# Patient Record
Sex: FEMALE | Race: WHITE | Employment: UNEMPLOYED | URBAN - METROPOLITAN AREA
[De-identification: names, ages, dates, MRNs, and addresses within clinical notes are randomized per-mention and may not be internally consistent; named-entity substitution may affect disease eponyms.]

---

## 2018-03-07 ENCOUNTER — APPOINTMENT (OUTPATIENT)
Dept: ULTRASOUND IMAGING | Age: 73
DRG: 417 | End: 2018-03-07
Attending: PHYSICIAN ASSISTANT
Payer: MEDICARE

## 2018-03-07 ENCOUNTER — APPOINTMENT (OUTPATIENT)
Dept: GENERAL RADIOLOGY | Age: 73
DRG: 417 | End: 2018-03-07
Attending: PHYSICIAN ASSISTANT
Payer: MEDICARE

## 2018-03-07 ENCOUNTER — HOSPITAL ENCOUNTER (INPATIENT)
Age: 73
LOS: 4 days | Discharge: HOME HEALTH | DRG: 417 | End: 2018-03-11
Attending: EMERGENCY MEDICINE | Admitting: FAMILY MEDICINE
Payer: MEDICARE

## 2018-03-07 ENCOUNTER — APPOINTMENT (OUTPATIENT)
Dept: CT IMAGING | Age: 73
DRG: 417 | End: 2018-03-07
Attending: PHYSICIAN ASSISTANT
Payer: MEDICARE

## 2018-03-07 DIAGNOSIS — K80.21 CALCULUS OF GALLBLADDER WITH BILIARY OBSTRUCTION BUT WITHOUT CHOLECYSTITIS: ICD-10-CM

## 2018-03-07 DIAGNOSIS — Z90.49 S/P LAPAROSCOPIC CHOLECYSTECTOMY: ICD-10-CM

## 2018-03-07 DIAGNOSIS — D72.829 LEUKOCYTOSIS, UNSPECIFIED TYPE: ICD-10-CM

## 2018-03-07 DIAGNOSIS — K80.50 CHOLEDOCHOLITHIASIS: ICD-10-CM

## 2018-03-07 DIAGNOSIS — K83.09 ACUTE CHOLANGITIS: Primary | ICD-10-CM

## 2018-03-07 PROBLEM — K80.20 CHOLELITHIASES: Status: ACTIVE | Noted: 2018-03-07

## 2018-03-07 LAB
ALBUMIN SERPL-MCNC: 3.9 G/DL (ref 3.4–5)
ALBUMIN/GLOB SERPL: 1 {RATIO} (ref 0.8–1.7)
ALP SERPL-CCNC: 777 U/L (ref 45–117)
ALT SERPL-CCNC: 343 U/L (ref 13–56)
ANION GAP SERPL CALC-SCNC: 10 MMOL/L (ref 3–18)
APPEARANCE UR: CLEAR
AST SERPL-CCNC: 190 U/L (ref 15–37)
ATRIAL RATE: 78 BPM
BASOPHILS # BLD: 0 K/UL (ref 0–0.06)
BASOPHILS NFR BLD: 0 % (ref 0–3)
BILIRUB DIRECT SERPL-MCNC: 6 MG/DL (ref 0–0.2)
BILIRUB SERPL-MCNC: 7.5 MG/DL (ref 0.2–1)
BILIRUB UR QL: NEGATIVE
BUN SERPL-MCNC: 10 MG/DL (ref 7–18)
BUN/CREAT SERPL: 13 (ref 12–20)
CALCIUM SERPL-MCNC: 9.3 MG/DL (ref 8.5–10.1)
CALCULATED P AXIS, ECG09: 49 DEGREES
CALCULATED R AXIS, ECG10: -10 DEGREES
CALCULATED T AXIS, ECG11: 68 DEGREES
CHLORIDE SERPL-SCNC: 95 MMOL/L (ref 100–108)
CK MB CFR SERPL CALC: NORMAL % (ref 0–4)
CK MB SERPL-MCNC: <1 NG/ML (ref 5–25)
CK SERPL-CCNC: 39 U/L (ref 26–192)
CO2 SERPL-SCNC: 25 MMOL/L (ref 21–32)
COLOR UR: ABNORMAL
CREAT SERPL-MCNC: 0.78 MG/DL (ref 0.6–1.3)
DIAGNOSIS, 93000: NORMAL
DIFFERENTIAL METHOD BLD: ABNORMAL
EOSINOPHIL # BLD: 0 K/UL (ref 0–0.4)
EOSINOPHIL NFR BLD: 0 % (ref 0–5)
ERYTHROCYTE [DISTWIDTH] IN BLOOD BY AUTOMATED COUNT: 14.1 % (ref 11.6–14.5)
GLOBULIN SER CALC-MCNC: 3.8 G/DL (ref 2–4)
GLUCOSE SERPL-MCNC: 96 MG/DL (ref 74–99)
GLUCOSE UR STRIP.AUTO-MCNC: NEGATIVE MG/DL
HCT VFR BLD AUTO: 39.4 % (ref 35–45)
HGB BLD-MCNC: 13.7 G/DL (ref 12–16)
HGB UR QL STRIP: NEGATIVE
KETONES UR QL STRIP.AUTO: 40 MG/DL
LACTATE BLD-SCNC: 0.9 MMOL/L (ref 0.4–2)
LEUKOCYTE ESTERASE UR QL STRIP.AUTO: NEGATIVE
LIPASE SERPL-CCNC: ABNORMAL U/L (ref 73–393)
LYMPHOCYTES # BLD: 12.4 K/UL (ref 0.8–3.5)
LYMPHOCYTES NFR BLD: 46 % (ref 20–51)
MAGNESIUM SERPL-MCNC: 2.2 MG/DL (ref 1.6–2.6)
MCH RBC QN AUTO: 29.4 PG (ref 24–34)
MCHC RBC AUTO-ENTMCNC: 34.8 G/DL (ref 31–37)
MCV RBC AUTO: 84.5 FL (ref 74–97)
MONOCYTES # BLD: 0.8 K/UL (ref 0–1)
MONOCYTES NFR BLD: 3 % (ref 2–9)
NEUTS BAND NFR BLD MANUAL: 4 % (ref 0–5)
NEUTS SEG # BLD: 13.8 K/UL (ref 1.8–8)
NEUTS SEG NFR BLD: 47 % (ref 42–75)
NITRITE UR QL STRIP.AUTO: NEGATIVE
P-R INTERVAL, ECG05: 172 MS
PH UR STRIP: 7 [PH] (ref 5–8)
PLATELET # BLD AUTO: 289 K/UL (ref 135–420)
PLATELET COMMENTS,PCOM: ABNORMAL
PMV BLD AUTO: 10.2 FL (ref 9.2–11.8)
POTASSIUM SERPL-SCNC: 3.3 MMOL/L (ref 3.5–5.5)
PROT SERPL-MCNC: 7.7 G/DL (ref 6.4–8.2)
PROT UR STRIP-MCNC: NEGATIVE MG/DL
Q-T INTERVAL, ECG07: 378 MS
QRS DURATION, ECG06: 80 MS
QTC CALCULATION (BEZET), ECG08: 430 MS
RBC # BLD AUTO: 4.66 M/UL (ref 4.2–5.3)
RBC MORPH BLD: ABNORMAL
SODIUM SERPL-SCNC: 130 MMOL/L (ref 136–145)
SP GR UR REFRACTOMETRY: >1.03 (ref 1–1.03)
TROPONIN I SERPL-MCNC: <0.02 NG/ML (ref 0–0.04)
UROBILINOGEN UR QL STRIP.AUTO: 1 EU/DL (ref 0.2–1)
VENTRICULAR RATE, ECG03: 78 BPM
WBC # BLD AUTO: 27 K/UL (ref 4.6–13.2)

## 2018-03-07 PROCEDURE — 83605 ASSAY OF LACTIC ACID: CPT

## 2018-03-07 PROCEDURE — 93005 ELECTROCARDIOGRAM TRACING: CPT

## 2018-03-07 PROCEDURE — 80053 COMPREHEN METABOLIC PANEL: CPT | Performed by: EMERGENCY MEDICINE

## 2018-03-07 PROCEDURE — 81003 URINALYSIS AUTO W/O SCOPE: CPT | Performed by: PHYSICIAN ASSISTANT

## 2018-03-07 PROCEDURE — 74011636320 HC RX REV CODE- 636/320: Performed by: PHYSICIAN ASSISTANT

## 2018-03-07 PROCEDURE — 83735 ASSAY OF MAGNESIUM: CPT | Performed by: SURGERY

## 2018-03-07 PROCEDURE — 87040 BLOOD CULTURE FOR BACTERIA: CPT | Performed by: FAMILY MEDICINE

## 2018-03-07 PROCEDURE — 99285 EMERGENCY DEPT VISIT HI MDM: CPT

## 2018-03-07 PROCEDURE — 82553 CREATINE MB FRACTION: CPT | Performed by: EMERGENCY MEDICINE

## 2018-03-07 PROCEDURE — 65660000000 HC RM CCU STEPDOWN

## 2018-03-07 PROCEDURE — 96375 TX/PRO/DX INJ NEW DRUG ADDON: CPT

## 2018-03-07 PROCEDURE — 65270000029 HC RM PRIVATE

## 2018-03-07 PROCEDURE — 74011250636 HC RX REV CODE- 250/636: Performed by: EMERGENCY MEDICINE

## 2018-03-07 PROCEDURE — 74011000258 HC RX REV CODE- 258: Performed by: FAMILY MEDICINE

## 2018-03-07 PROCEDURE — 74177 CT ABD & PELVIS W/CONTRAST: CPT

## 2018-03-07 PROCEDURE — 96374 THER/PROPH/DIAG INJ IV PUSH: CPT

## 2018-03-07 PROCEDURE — 71045 X-RAY EXAM CHEST 1 VIEW: CPT

## 2018-03-07 PROCEDURE — 96376 TX/PRO/DX INJ SAME DRUG ADON: CPT

## 2018-03-07 PROCEDURE — 74011250636 HC RX REV CODE- 250/636: Performed by: FAMILY MEDICINE

## 2018-03-07 PROCEDURE — 74011000250 HC RX REV CODE- 250: Performed by: SURGERY

## 2018-03-07 PROCEDURE — 82248 BILIRUBIN DIRECT: CPT | Performed by: PHYSICIAN ASSISTANT

## 2018-03-07 PROCEDURE — 96361 HYDRATE IV INFUSION ADD-ON: CPT

## 2018-03-07 PROCEDURE — 74011250636 HC RX REV CODE- 250/636: Performed by: PHYSICIAN ASSISTANT

## 2018-03-07 PROCEDURE — 74011250636 HC RX REV CODE- 250/636: Performed by: SURGERY

## 2018-03-07 PROCEDURE — 76705 ECHO EXAM OF ABDOMEN: CPT

## 2018-03-07 PROCEDURE — 85025 COMPLETE CBC W/AUTO DIFF WBC: CPT | Performed by: EMERGENCY MEDICINE

## 2018-03-07 PROCEDURE — 83690 ASSAY OF LIPASE: CPT | Performed by: EMERGENCY MEDICINE

## 2018-03-07 PROCEDURE — 74011000250 HC RX REV CODE- 250: Performed by: EMERGENCY MEDICINE

## 2018-03-07 RX ORDER — HYDROMORPHONE HYDROCHLORIDE 1 MG/ML
0.5 INJECTION, SOLUTION INTRAMUSCULAR; INTRAVENOUS; SUBCUTANEOUS
Status: DISCONTINUED | OUTPATIENT
Start: 2018-03-07 | End: 2018-03-07

## 2018-03-07 RX ORDER — ONDANSETRON 2 MG/ML
4 INJECTION INTRAMUSCULAR; INTRAVENOUS
Status: COMPLETED | OUTPATIENT
Start: 2018-03-07 | End: 2018-03-07

## 2018-03-07 RX ORDER — METRONIDAZOLE 500 MG/100ML
500 INJECTION, SOLUTION INTRAVENOUS EVERY 8 HOURS
Status: DISCONTINUED | OUTPATIENT
Start: 2018-03-07 | End: 2018-03-11 | Stop reason: HOSPADM

## 2018-03-07 RX ORDER — DEXTROSE MONOHYDRATE AND SODIUM CHLORIDE 5; .45 G/100ML; G/100ML
100 INJECTION, SOLUTION INTRAVENOUS CONTINUOUS
Status: DISCONTINUED | OUTPATIENT
Start: 2018-03-07 | End: 2018-03-08

## 2018-03-07 RX ORDER — HYDROMORPHONE HYDROCHLORIDE 1 MG/ML
1 INJECTION, SOLUTION INTRAMUSCULAR; INTRAVENOUS; SUBCUTANEOUS
Status: COMPLETED | OUTPATIENT
Start: 2018-03-07 | End: 2018-03-07

## 2018-03-07 RX ORDER — ONDANSETRON 2 MG/ML
4 INJECTION INTRAMUSCULAR; INTRAVENOUS ONCE
Status: COMPLETED | OUTPATIENT
Start: 2018-03-07 | End: 2018-03-07

## 2018-03-07 RX ORDER — MORPHINE SULFATE 4 MG/ML
2 INJECTION, SOLUTION INTRAMUSCULAR; INTRAVENOUS
Status: COMPLETED | OUTPATIENT
Start: 2018-03-07 | End: 2018-03-07

## 2018-03-07 RX ORDER — ONDANSETRON 4 MG/1
4 TABLET, ORALLY DISINTEGRATING ORAL
Status: DISCONTINUED | OUTPATIENT
Start: 2018-03-07 | End: 2018-03-11 | Stop reason: HOSPADM

## 2018-03-07 RX ORDER — MORPHINE SULFATE 2 MG/ML
2 INJECTION, SOLUTION INTRAMUSCULAR; INTRAVENOUS
Status: DISCONTINUED | OUTPATIENT
Start: 2018-03-07 | End: 2018-03-09

## 2018-03-07 RX ORDER — MORPHINE SULFATE 2 MG/ML
2 INJECTION, SOLUTION INTRAMUSCULAR; INTRAVENOUS
Status: DISCONTINUED | OUTPATIENT
Start: 2018-03-07 | End: 2018-03-07

## 2018-03-07 RX ADMIN — Medication 2 MG: at 17:30

## 2018-03-07 RX ADMIN — DEXTROSE MONOHYDRATE AND SODIUM CHLORIDE 100 ML/HR: 5; .45 INJECTION, SOLUTION INTRAVENOUS at 11:59

## 2018-03-07 RX ADMIN — ONDANSETRON 4 MG: 2 INJECTION INTRAMUSCULAR; INTRAVENOUS at 06:47

## 2018-03-07 RX ADMIN — Medication 2 MG: at 22:04

## 2018-03-07 RX ADMIN — Medication 2 G: at 17:37

## 2018-03-07 RX ADMIN — Medication 2 G: at 10:21

## 2018-03-07 RX ADMIN — SODIUM CHLORIDE 20 MG: 9 INJECTION INTRAMUSCULAR; INTRAVENOUS; SUBCUTANEOUS at 17:37

## 2018-03-07 RX ADMIN — MORPHINE SULFATE 2 MG: 4 INJECTION, SOLUTION INTRAMUSCULAR; INTRAVENOUS at 06:47

## 2018-03-07 RX ADMIN — HYDROMORPHONE HYDROCHLORIDE 1 MG: 1 INJECTION, SOLUTION INTRAMUSCULAR; INTRAVENOUS; SUBCUTANEOUS at 10:21

## 2018-03-07 RX ADMIN — Medication 2 MG: at 15:15

## 2018-03-07 RX ADMIN — ONDANSETRON 4 MG: 2 INJECTION INTRAMUSCULAR; INTRAVENOUS at 15:15

## 2018-03-07 RX ADMIN — MORPHINE SULFATE 2 MG: 4 INJECTION, SOLUTION INTRAMUSCULAR; INTRAVENOUS at 08:51

## 2018-03-07 RX ADMIN — METRONIDAZOLE 500 MG: 500 INJECTION, SOLUTION INTRAVENOUS at 17:39

## 2018-03-07 RX ADMIN — IOPAMIDOL 100 ML: 612 INJECTION, SOLUTION INTRAVENOUS at 07:38

## 2018-03-07 RX ADMIN — ONDANSETRON 4 MG: 2 INJECTION INTRAMUSCULAR; INTRAVENOUS at 08:55

## 2018-03-07 RX ADMIN — SODIUM CHLORIDE 1000 ML: 900 INJECTION, SOLUTION INTRAVENOUS at 08:51

## 2018-03-07 RX ADMIN — METRONIDAZOLE 500 MG: 500 INJECTION, SOLUTION INTRAVENOUS at 10:21

## 2018-03-07 RX ADMIN — Medication 2 MG: at 19:34

## 2018-03-07 NOTE — ED NOTES
Pt arrived to the ED by EMS with co ABD and epigastric pain since Monday. Pt states she had been taking prilosec for a long time and stopped on Sunday. Pt A&Ox4. Pt denies NVD or fever.  Hx GERD

## 2018-03-07 NOTE — CONSULTS
[email or web address]  [phone]    Impression:   1. Abdominal pain-  -lipase 04747  2. Jaundice  -ultrasound- pending  -Ct abdomen- Severe intrahepatic and extrahepatic biliary dilatation which is most likely due  to choledocholithiasis at the level of the ampulla. There is also extensive cholelithiasis in the gallbladder which is moderately distended but without  definite adjacent inflammatory change. 3. Elevated LFTs/BILI-  -BILI 6  -ALT- 343  -SRT639  -ALK PHOS- 777  4. Leukocytosis-   -WBC 27      Plan:     1. Cont NPo  2. Cont supportive care  Await for recommendation from attending      Chief Complaint: abdominal pian      HPI:  Sveta Khan is a 67 y.o. female who is being seen on consult for 3 day h/o abdominal pain and feeling feverish. Abdominal pain waxes and wanes. Associated with nausea with 1 episode of  vomiting. Pt with h/o GERD on PPI at home. Pt denies alcohol, tobacco or ilicit drug use. PMH:   Past Medical History:   Diagnosis Date    Chronic GERD        PSH:   No past surgical history on file. Social HX:   Social History     Social History    Marital status:      Spouse name: N/A    Number of children: N/A    Years of education: N/A     Occupational History    Not on file. Social History Main Topics    Smoking status: Not on file    Smokeless tobacco: Not on file    Alcohol use Not on file    Drug use: Not on file    Sexual activity: Not on file     Other Topics Concern    Not on file     Social History Narrative    No narrative on file       FHX:   No family history on file. Allergy:   No Known Allergies    Home Medications:       (Not in a hospital admission)    Review of Systems:     A fourteen point review of systems was obtained, and was negative except per HPI.     Visit Vitals    /75    Pulse 84    Temp 97.8 °F (36.6 °C)    Resp 19    Wt 68 kg (150 lb)    SpO2 98%       Physical Assessment:     constitutional: appearance: well developed, well nourished, in no acute distress. skin: no rashes, jaundice  eyes: inspection: normal conjunctivae and lids; no scleral icterus pupils: equal, round, reactive to light; extraocular movements intact  ENMT: mouth: mucous membranes moist, no thrush  neck:  no masses or tenderness; normal range of motion  respiratory: breath sounds clear, no wheeze/rales/rhonchi  cardiovascular: normal rate, regular rhythm without murmur  abdominal: soft, bowel sounds present, non-tender to palpation without rebound or guarding; no appreciable mass; no appreciable hepatosplenomegaly; no appreciable fluid wave  extremities: no clubbing, cyanosis, edema  neurologic:  Cranial nerves II-XII grossly intact; no asterixis   psychiatric:  oriented to time, space and person. Basic Metabolic Profile   Recent Labs      03/07/18   0614   NA  130*   K  3.3*   CL  95*   CO2  25   BUN  10   GLU  96   CA  9.3         CBC w/Diff    Recent Labs      03/07/18   0614   WBC  27.0*   RBC  4.66   HGB  13.7   HCT  39.4   MCV  84.5   MCH  29.4   MCHC  34.8   RDW  14.1   PLT  289    Recent Labs      03/07/18   0614   GRANS  47   LYMPH  46   EOS  0        Hepatic Function   Recent Labs      03/07/18   0614   ALB  3.9   TP  7.7   TBILI  7.5*   SGOT  190*   AP  777*   LPSE  00446*          Quentin Aly NP  [facility]  [email or web address]

## 2018-03-07 NOTE — H&P
History and Physical    Patient: Luis Alberto Martinez MRN: [id number]  SSN: [id number]    YOB: 1945  Age: 67 y.o. Sex: female      Subjective: Luis Alberto Martinez is a 67 y.o. female who began having abdominal pain with nausea 2 days ago. Pain did improve yesterday but became worse last night so patient presented to the ER. Patient found to have elevated LFTs, leukocytosis and dilated biliary tree c/w impacted gall stone. Admit for probable choledocaliathisis. Past Medical History:   Diagnosis Date    Chronic GERD      No past surgical history on file. No family history on file. Social History   Substance Use Topics    Smoking status: Not on file    Smokeless tobacco: Not on file    Alcohol use Not on file      Prior to Admission medications    Not on File        No Known Allergies    Review of Systems:  Review of systems not obtained due to patient factors.     Objective:     Vitals:    03/07/18 0813 03/07/18 0815 03/07/18 0830 03/07/18 0845   BP: 182/73 173/69 174/69 172/75   Pulse: 84 84 84 84   Resp: 23 23 23 19   Temp: 97.8 °F (36.6 °C)      SpO2: 97% 98% 97% 98%   Weight:            Physical Exam:  GENERAL: alert, cooperative, mild distress, appears stated age  LUNG: clear to auscultation bilaterally  HEART: regular rate and rhythm, S1, S2 normal, no murmur, click, rub or gallop  ABDOMEN: abnormal findings:  tenderness moderate in the epigastrium and in the RUQ, hypoactive bowel sounds  EXTREMITIES:  extremities normal, atraumatic, no cyanosis or edema  SKIN: slight jaundice    Assessment:     Hospital Problems  Date Reviewed: 3/7/2018          Codes Class Noted POA    Cholangitis ICD-10-CM: K83.0  ICD-9-CM: 576.1  3/7/2018 Unknown        Choledocholithiasis ICD-10-CM: K80.50  ICD-9-CM: 574.50  3/7/2018 Unknown        Cholelithiases ICD-10-CM: K80.20  ICD-9-CM: 574.20  3/7/2018 Unknown              Plan:     Admit cefepime and flagyl IV NPO Gastroenterology and surgery consulted.     Signed By: Sandhya Holcomb MD     March 7, 2018

## 2018-03-07 NOTE — IP AVS SNAPSHOT
[facility] 
 
 
 [address] [facility] [address] [place] [address] Patient: Luis Alberto Martinez MRN: [id number] About your hospitalization You were admitted on:  March 7, 2018 You last received care in the:  [facility] [address] You were discharged on:  March 11, 2018 Why you were hospitalized Your primary diagnosis was:  Not on File Your diagnoses also included:  Cholangitis, Choledocholithiasis, Cholelithiases, S/P Laparoscopic Cholecystectomy Follow-up Information Follow up With Details Comments Contact Info Alessandra Scales MD [facility] [id number] [phone] Margy Kussmaul, MD In 2 weeks  [address] [address] [phone] Discharge Orders None A check napoleon indicates which time of day the medication should be taken. My Medications START taking these medications Instructions Each Dose to Equal  
 Morning Noon Evening Bedtime  
 cloNIDine HCl 0.1 mg tablet Commonly known as:  CATAPRES Your last dose was: Your next dose is: Take 1 Tab by mouth three (3) times daily as needed. SBP above 160  
 0.1 mg  
    
   
   
   
  
 docusate sodium 100 mg capsule Commonly known as:  Delma Minerva Your last dose was: Your next dose is: Take 1 Cap by mouth two (2) times daily as needed for Constipation for up to 90 days. 100 mg  
    
   
   
   
  
 famotidine 20 mg tablet Commonly known as:  PEPCID Your last dose was: Your next dose is: Take 1 Tab by mouth two (2) times a day. Indications: Dyspepsia 20 mg HYDROcodone-acetaminophen 7.5-325 mg per tablet Commonly known as:  Landy Pont Your last dose was: Your next dose is: Take 1-2 Tabs by mouth every four (4) hours as needed. Max Daily Amount: 12 Tabs. Indications: Pain 1-2 Tab  
    
   
   
   
  
 metoprolol tartrate 50 mg tablet Commonly known as:  LOPRESSOR Your last dose was: Your next dose is: Take 1 Tab by mouth every twelve (12) hours. 50 mg  
    
   
   
   
  
 ondansetron 4 mg disintegrating tablet Commonly known as:  ZOFRAN ODT Your last dose was: Your next dose is: Take 1 Tab by mouth every six (6) hours as needed. 4 mg Where to Get Your Medications Information on where to get these meds will be given to you by the nurse or doctor. ! Ask your nurse or doctor about these medications  
  cloNIDine HCl 0.1 mg tablet  
 docusate sodium 100 mg capsule  
 famotidine 20 mg tablet HYDROcodone-acetaminophen 7.5-325 mg per tablet  
 metoprolol tartrate 50 mg tablet  
 ondansetron 4 mg disintegrating tablet Discharge Instructions None [email or web address] Announcement We are excited to announce that we are making your provider's discharge notes available to you in [email or web address]. You will see these notes when they are completed and signed by the physician that discharged you from your recent hospital stay. If you have any questions or concerns about any information you see in [email or web address], please call the Health Information Department where you were seen or reach out to your Primary Care Provider for more information about your plan of care. Introducing [address]! Kimmie Jerez introduces [email or web address] patient portal. Now you can access parts of your medical record, email your doctor's office, and request medication refills online. 1. In your internet browser, go to [email or web address] 2. Click on the First Time User? Click Here link in the Sign In box. You will see the New Member Sign Up page. 3. Enter your [email or web address] Access Code exactly as it appears below. You will not need to use this code after youve completed the sign-up process. If you do not sign up before the expiration date, you must request a new code. · [email or web address] Access Code: [email or web address] Expires: 6/9/2018  2:57 PM 
 
4. Enter the last four digits of your Social Security Number (xxxx) and Date of Birth (mm/dd/yyyy) as indicated and click Submit. You will be taken to the next sign-up page. 5. Create a [email or web address] ID. This will be your [email or web address] login ID and cannot be changed, so think of one that is secure and easy to remember. 6. Create a [email or web address] password. You can change your password at any time. 7. Enter your Password Reset Question and Answer. This can be used at a later time if you forget your password. 8. Enter your e-mail address. You will receive e-mail notification when new information is available in [address]. 9. Click Sign Up. You can now view and download portions of your medical record. 10. Click the Download Summary menu link to download a portable copy of your medical information. If you have questions, please visit the Frequently Asked Questions section of the [email or web address] website. Remember, [email or web address] is NOT to be used for urgent needs. For medical emergencies, dial 911. Now available from your iPhone and Android! Unresulted Labs-Please follow up with your PCP about these lab tests Order Current Status NC XR TECHNOLOGIST SERVICE In process CULTURE, BLOOD Preliminary result Providers Seen During Your Hospitalization Provider Specialty Primary office phone Oscar Jhaveri MD Emergency Medicine [phone] Hiwot Brady MD Emergency Medicine [phone] Amber Bender MD Family Practice [phone] Your Primary Care Physician (PCP) Primary Care Physician Office Phone Office Fax Carola Hodgkins [phone] You are allergic to the following No active allergies Recent Documentation Height Weight Breastfeeding? BMI Smoking Status 1.651 m 68.4 kg No 25.11 kg/m2 Never Assessed Emergency Contacts Name Discharge Info Relation Home Work Mobile Celestino Centeno DISCHARGE CAREGIVER [3] Spouse [3] [phone] Patient Belongings The following personal items are in your possession at time of discharge: 
  Dental Appliances: None  Visual Aid: None                  Other Valuables: Cell Phone  Personal Items Sent to Safe: none Please provide this summary of care documentation to your next provider. Signatures-by signing, you are acknowledging that this After Visit Summary has been reviewed with you and you have received a copy. Patient Signature:  ____________________________________________________________ Date:  ____________________________________________________________  
  
[facility] Provider Signature:  ____________________________________________________________ Date:  ____________________________________________________________

## 2018-03-07 NOTE — ED NOTES
Eula Ruggiero TRANSFER - OUT REPORT:    Verbal report given to Martha Mesa RN(name) on Jose A Curry  being transferred to [address] [place] [address](unit) for routine progression of care       Report consisted of patients Situation, Background, Assessment and   Recommendations(SBAR). Information from the following report(s) ED Summary, MAR and Recent Results was reviewed with the receiving nurse. Lines:   Peripheral IV 03/07/18 Right Antecubital (Active)   Site Assessment Clean, dry, & intact 3/7/2018  6:18 AM   Phlebitis Assessment 0 3/7/2018  6:18 AM   Infiltration Assessment 0 3/7/2018  6:18 AM   Dressing Status Clean, dry, & intact 3/7/2018  6:18 AM   Dressing Type Tape;Transparent 3/7/2018  6:18 AM   Hub Color/Line Status Pink;Flushed;Patent 3/7/2018  6:18 AM        Opportunity for questions and clarification was provided.       Patient transported with:  RN

## 2018-03-07 NOTE — CONSULTS
General Surgery consult dictated [id number]    Needs cholecystectomy prior to discharge. Intraoperative cholangiography will be offered too if pre op cholangiogram not done by GI service. No time/date set for surgery, agree with period of rest/ NPO (ice chips for comfort OK) to let gallstone pancreatitis improve.

## 2018-03-07 NOTE — ED PROVIDER NOTES
EMERGENCY DEPARTMENT HISTORY AND PHYSICAL EXAM    Date: 3/7/2018  Patient Name: Ly Walter    History of Presenting Illness     Chief Complaint   Patient presents with    Abdominal Pain         History Provided By: Patient. Chief Complaint: Abdominal pain. Duration: 3 Days  Timing:  Waxing and Waning  Location: Abdomen  Quality: Sharp  Severity: Moderate  Modifying Factors: None identified. Associated Symptoms: nausea, recent diarrhea, subjective fevers      Additional History (Context): Ly Walter is a 67 y.o. female with GERD who presents with report of 3 days of upper abdominal pain. Pt reports pain began three days ago, was absent yesterday, and returned around 0200 this morning. Pt describes the pain as sharp and squeezing that waxes and wanes. No known modifying factors. Associated with nausea, no vomiting. States the pain makes her feel SOB at times. States she has felt intermittently feverish x 3 days. Diarrhea yesterday that has since resolved, denies abnormal looking stool. Has a history of GERD, has not been taking omeprazole recently due to nausea. Pt denies recent color change to skin/eyes, denies h/o liver disease. PCP: Imtiaz Petty MD        Past History     Past Medical History:  Past Medical History:   Diagnosis Date    Chronic GERD        Past Surgical History:  No past surgical history on file. Family History:  No family history on file. Social History:  Social History   Substance Use Topics    Smoking status: Not on file    Smokeless tobacco: Not on file    Alcohol use Not on file       Allergies:  No Known Allergies      Review of Systems   Review of Systems   Constitutional: Positive for fever. HENT: Negative for rhinorrhea and sore throat. Eyes: Negative for visual disturbance. Respiratory: Positive for shortness of breath. Negative for cough. Cardiovascular: Negative for chest pain. Gastrointestinal: Positive for abdominal pain, diarrhea and nausea. Negative for abdominal distention, blood in stool and vomiting. Genitourinary: Negative for dysuria. Skin: Negative for color change and rash. Neurological: Negative for syncope. Psychiatric/Behavioral: Negative for confusion. All Other Systems Negative  Physical Exam     Vitals:    03/07/18 0813 03/07/18 0815 03/07/18 0830 03/07/18 0845   BP: 182/73 173/69 174/69 172/75   Pulse: 84 84 84 84   Resp: 23 23 23 19   Temp: 97.8 °F (36.6 °C)      SpO2: 97% 98% 97% 98%   Weight:         Physical Exam   Constitutional: She is oriented to person, place, and time. She appears well-developed and well-nourished. She appears distressed. HENT:   Head: Normocephalic and atraumatic. Right Ear: External ear normal.   Left Ear: External ear normal.   Nose: Nose normal.   Eyes: Scleral icterus is present. Neck: Normal range of motion. Cardiovascular: Normal rate, regular rhythm and normal heart sounds. Pulmonary/Chest: Effort normal and breath sounds normal. No respiratory distress. Abdominal: Soft. She exhibits no distension. There is no rebound and no guarding. Moderately tender in the RUQ, epigastrium region with diffuse mild tenderness throughout the rest of the abdomen. No rebound or guarding. Neurological: She is alert and oriented to person, place, and time. Skin: Skin is warm and dry. She is not diaphoretic. Jaundiced. Psychiatric: She has a normal mood and affect. Her behavior is normal.   Vitals reviewed.        Diagnostic Study Results     Labs -     Recent Results (from the past 12 hour(s))   EKG, 12 LEAD, INITIAL    Collection Time: 03/07/18  6:04 AM   Result Value Ref Range    Ventricular Rate 78 BPM    Atrial Rate 78 BPM    P-R Interval 172 ms    QRS Duration 80 ms    Q-T Interval 378 ms    QTC Calculation (Bezet) 430 ms    Calculated P Axis 49 degrees    Calculated R Axis -10 degrees    Calculated T Axis 68 degrees    Diagnosis       Normal sinus rhythm  Moderate voltage criteria for LVH, may be normal variant  Borderline ECG  No previous ECGs available     CBC WITH AUTOMATED DIFF    Collection Time: 03/07/18  6:14 AM   Result Value Ref Range    WBC 27.0 (H) 4.6 - 13.2 K/uL    RBC 4.66 4.20 - 5.30 M/uL    HGB 13.7 12.0 - 16.0 g/dL    HCT 39.4 35.0 - 45.0 %    MCV 84.5 74.0 - 97.0 FL    MCH 29.4 24.0 - 34.0 PG    MCHC 34.8 31.0 - 37.0 g/dL    RDW 14.1 11.6 - 14.5 %    PLATELET 361 055 - 812 K/uL    MPV 10.2 9.2 - 11.8 FL    NEUTROPHILS 47 42 - 75 %    BAND NEUTROPHILS 4 0 - 5 %    LYMPHOCYTES 46 20 - 51 %    MONOCYTES 3 2 - 9 %    EOSINOPHILS 0 0 - 5 %    BASOPHILS 0 0 - 3 %    ABS. NEUTROPHILS 13.8 (H) 1.8 - 8.0 K/UL    ABS. LYMPHOCYTES 12.4 (H) 0.8 - 3.5 K/UL    ABS. MONOCYTES 0.8 0 - 1.0 K/UL    ABS. EOSINOPHILS 0.0 0.0 - 0.4 K/UL    ABS. BASOPHILS 0.0 0.0 - 0.06 K/UL    DF MANUAL      PLATELET COMMENTS ADEQUATE PLATELETS      RBC COMMENTS NORMOCYTIC, NORMOCHROMIC     CARDIAC PANEL,(CK, CKMB & TROPONIN)    Collection Time: 03/07/18  6:14 AM   Result Value Ref Range    CK 39 26 - 192 U/L    CK - MB <1.0 <3.6 ng/ml    CK-MB Index  0.0 - 4.0 %     CALCULATION NOT PERFORMED WHEN RESULT IS BELOW LINEAR LIMIT    Troponin-I, Qt. <0.02 0.0 - 9.219 NG/ML   METABOLIC PANEL, COMPREHENSIVE    Collection Time: 03/07/18  6:14 AM   Result Value Ref Range    Sodium 130 (L) 136 - 145 mmol/L    Potassium 3.3 (L) 3.5 - 5.5 mmol/L    Chloride 95 (L) 100 - 108 mmol/L    CO2 25 21 - 32 mmol/L    Anion gap 10 3.0 - 18 mmol/L    Glucose 96 74 - 99 mg/dL    BUN 10 7.0 - 18 MG/DL    Creatinine 0.78 0.6 - 1.3 MG/DL    BUN/Creatinine ratio 13 12 - 20      GFR est AA >60 >60 ml/min/1.73m2    GFR est non-AA >60 >60 ml/min/1.73m2    Calcium 9.3 8.5 - 10.1 MG/DL    Bilirubin, total 7.5 (H) 0.2 - 1.0 MG/DL    ALT (SGPT) 343 (H) 13 - 56 U/L    AST (SGOT) 190 (H) 15 - 37 U/L    Alk.  phosphatase 777 (H) 45 - 117 U/L    Protein, total 7.7 6.4 - 8.2 g/dL    Albumin 3.9 3.4 - 5.0 g/dL    Globulin 3.8 2.0 - 4.0 g/dL    A-G Ratio 1.0 0.8 - 1.7     LIPASE    Collection Time: 03/07/18  6:14 AM   Result Value Ref Range    Lipase 67876 (H) 73 - 393 U/L   BILIRUBIN, DIRECT    Collection Time: 03/07/18  6:14 AM   Result Value Ref Range    Bilirubin, direct 6.0 (H) 0.0 - 0.2 MG/DL   URINALYSIS W/ RFLX MICROSCOPIC    Collection Time: 03/07/18  8:15 AM   Result Value Ref Range    Color DARK YELLOW      Appearance CLEAR      Specific gravity >1.030 (H) 1.005 - 1.030    pH (UA) 7.0 5.0 - 8.0      Protein NEGATIVE  NEG mg/dL    Glucose NEGATIVE  NEG mg/dL    Ketone 40 (A) NEG mg/dL    Bilirubin NEGATIVE  NEG      Blood NEGATIVE  NEG      Urobilinogen 1.0 0.2 - 1.0 EU/dL    Nitrites NEGATIVE  NEG      Leukocyte Esterase NEGATIVE  NEG     POC LACTIC ACID    Collection Time: 03/07/18  9:05 AM   Result Value Ref Range    Lactic Acid (POC) 0.9 0.4 - 2.0 mmol/L       Radiologic Studies -   CT ABD PELV W CONT   Final Result      XR CHEST PORT    (Results Pending)   US ABD LTD    (Results Pending)     CT Results  (Last 48 hours)               03/07/18 0738  CT ABD PELV W CONT Final result    Impression:  Impression:       Severe intrahepatic and extrahepatic biliary dilatation which is most likely due   to choledocholithiasis at the level of the ampulla. There is also extensive   cholelithiasis in the gallbladder which is moderately distended but without   definite adjacent inflammatory change. Prominent pancreatic duct could be due to mass effect from the   choledocholithiasis, but warrants follow-up after resolution of the biliary   dilatation to exclude persistent ductal dilatation. Diverticulosis without acute diverticulitis. Likely chronic T11 compression deformity. Narrative:  CT Abdomen And Pelvis with Intravenous Contrast       INDICATION: Generalized abdominal pain. Epigastric pain with nausea and   vomiting. .       TECHNIQUE: 5 mm collimation axial images obtained from the diaphragm to the   level of the pubic symphysis following the uneventful administration of  100 cc   of low osmolar, nonionic intravenous contrast.       Dose reduction techniques used: Automated exposure control, adjustment of the   mAs and/or kVp according to patient size, standardized low-dose protocol, and/or   iterative reconstruction technique. COMPARISON: None. ABDOMEN FINDINGS:       Lung Bases: Mild bibasilar atelectasis. No pleural effusion. Heart size is   normal..       Liver: Normal attenuation. No evidence for mass. Gallbladder: The gallbladder with minimal adjacent inflammation. Extensive   cholelithiasis. . Severe intrahepatic and extrahepatic biliary dilatation with   the common bile duct measuring up to 1.6 cm. This extends to the level of the   ampulla where there our likely noncalcified stone seen on coronal image 25. Shelly Rosemarie Pancreas: Prominent pancreatic duct without definite suspicious change in   caliber. Spleen: Normal in size. No evidence of mass. .       Adrenal Glands: No evidence for mass. Kidneys:        Right: Normal enhancement. Cyst on the lower pole of the right kidney. No   hydronephrosis. Left:  Normal enhancement. No cortical mass. No hydronephrosis. Lymph Nodes: No lymphadenopathy. Aorta:   Mild scattered atherosclerotic disease. Main portal vein, SMV, and   splenic vein are patent. PELVIS FINDINGS:        Bowel: Small Bowel: Normal in caliber with normal wall thickness. Large Bowel: Diverticulosis without acute diverticulitis. Shelly Rosemarie Appendix: Normal appendix. Bladder: Normal.       Uterus is atrophic. No suspicious adnexal mass. No significant free fluid. Bones: Degenerative changes of the spine. T11 compression deformity, likely   chronic. Shelly Rosemarie CXR Results  (Last 48 hours)    None            Medical Decision Making   I am the first provider for this patient.     I reviewed the vital signs, available nursing notes, past medical history, past surgical history, family history and social history. Vital Signs-Reviewed the patient's vital signs. Pulse Oximetry Analysis - 99% on RA    EKG: Interpreted by the EP Dr. Clarice Curry. Time Interpreted: 3389. Rate: 78.   Rhythm: NSR. Interpretation: no STEMI, moderate voltage criteria for LVH. Records Reviewed: Nursing Notes, Old Medical Records and Previous Laboratory Studies    Procedures:  Procedures    Provider Notes (Medical Decision Making): Pt with 3 days of abdominal pain, nausea and diarrhea. Skin is jaundiced, scleral icterus. RUQ and epigastric TTP. Will check labs, CT, address nausea and pain while waiting. 7:14 AM WBC 27.0, LFTs elevated, lipase 87324. Pt is currently in CT. Lactic ordered. 8:12 AM Pt back from CT, requesting bed pan. States pain improved slightly but is still present. More analgesics ordered. 8:44 AM CT results reviewed. Awaiting lactic before calling GI. Pt denies h/o cholelithiasis, liver, pancreas, gallbladder issues. 9:22 AM lactic not elevated, does not meet sepsis criteria at this time. Zosyn ordered for suspected acute cholangitis with pharmacy consult. GI paged. 9:37 AM Discussed with DANITA Marquez for GI group, states to keep patient NPO and she will come see the patient. Requests Donnie Smith be added to the treatment team.   9:46 AM Discussed with Dr. Nighat Mchugh PCP who will accept the admission. 10:58 AM Dr. Nighat Mchugh has requested I make surgery aware of the patient. Surgery ws paged but has not returned call. 12:30 PM Discussed with surgeon on call.  He is aware of the patient and asks to be added to the treatment team.     MED RECONCILIATION:  Current Facility-Administered Medications   Medication Dose Route Frequency    cefepime (MAXIPIME) 2 g in sterile water (preservative free) 10 mL IV syringe  2 g IntraVENous Q8H    metroNIDAZOLE (FLAGYL) IVPB premix 500 mg  500 mg IntraVENous Q8H    HYDROmorphone (DILAUDID) syringe 0.5 mg  0.5 mg IntraVENous NOW No current outpatient prescriptions on file. Disposition:  Admit. Follow-up Information     None          There are no discharge medications for this patient. For Hospitalized Patients:    1. Hospitalization Decision Time:  The decision to hospitalize the patient was made by myself and Dr. Graciela Munoz at 9:23 AM on 3/7/2018    2. Aspirin: Aspirin was not given because the patient did not present with a stroke at the time of their Emergency Department evaluation    Diagnosis     Clinical Impression:   1. Acute cholangitis    2. Choledocholithiasis    3. Leukocytosis, unspecified type    4.  Calculus of gallbladder with biliary obstruction but without cholecystitis          Val Coleman PA-C 10:09 AM

## 2018-03-07 NOTE — CONSULTS
Ernie  MR#: [id number]  : 1945  ACCOUNT #: [de-identified]   DATE OF SERVICE: 2018    This is a new consultation submitted by physician assistant Cristal Mcduffie from the emergency room. REASON FOR CONSULTATION:  Gallstones and gallstone pancreatitis with possible choledocholithiasis. HISTORY OF PRESENT ILLNESS:  The patient is a very pleasant 78-year-old  female who developed abdominal pain 2 days ago on Monday. This was associated with nonbloody diarrhea. The following day, yesterday, she actually felt better. The diarrhea resolved. However, early this morning when she tried to eat toast she developed severe recurrence of her abdominal pain, which now radiates into her back. She did feel feverish with chills. She does not have a functioning thermometer and did not actually take her temperature. She reported to the emergency room at [facility]. I was called by the physician assistant [facility] after an abdominal CAT scan was performed, which showed evidence of gallstones and with minimal adjacent inflammation. It also showed severe intrahepatic and extrahepatic biliary dilatation up to 1.6 cm. The prominent pancreatic duct as well. Because of this, she also had an ultrasound of her gallbladder, which showed cholelithiasis and again a dilated common bile duct 1.4 cm proximally. They could not see the duct all the way down to the ampulla due to bowel gas. They also did note some dilatation of the pancreatic duct at 5 mm. They did not specifically see choledocholithiasis, however, the CAT scan suggested that this was likely. Her laboratory studies were markedly abnormal with an elevated white blood cell count of 27,000 with a normal differential.  She did not have a fever. Her liver function tests were markedly abnormal with a total bilirubin of 7.5, a direct of 6.0 with a markedly elevated AST and  and 343 respectively. Her alkaline phosphatase was 777. Lipase was also markedly elevated at 29,134. Her lactic acid level was normal at 0.9. I was consulted along with a gastroenterologist, Dr. Donnie Smith for cholelithiasis, probable choledocholithiasis and gallstone pancreatitis. I have spoken with Dr. Alvina Gutierrez and he would like to have the pancreatitis settled down with conservative management prior to a possible ERCP. She was started empirically on antibiotics with IV cefepime and Flagyl. PAST MEDICAL HISTORY:  Notable for her gastroesophageal reflux disease. In the past, she was on . She was on omeprazole, but has stopped this. She also in the past notes elevated blood pressures, but has never been formally diagnosed with hypertension and never placed on medications. She is G1, P3 with a 40-year-old son born by vaginal delivery. She has had no past surgical  procedures. CURRENT MEDICATIONS AT HOME: A fiber supplement and she occasionally takes an Aleve PM.  She is no longer taking her over-the-counter omeprazole. ALLERGIES:  SHE REPORTS NO KNOWN DRUG ALLERGIES. SOCIAL HISTORY:  She is  and lives with her . She does not smoke, rarely drinks alcohol and none recently. She is retired from the [employer] at [employer] where she also worked in the [employer]. FAMILY HISTORY:  Her father late in life developed diabetes and was on oral medication. Her mother developed leukemia at age 80 and  from this. She has a sister who had sepsis of unknown cause and was in a coma for a period of time. She recovered and is well now. There are no immediate family members with history of gallstone or gallbladder problems. REVIEW OF SYSTEMS:  As noted above, her pain and diarrhea developed 2 days ago. The diarrhea has since resolved. She has had no documented fevers at home.   She does report some yellow discoloration of her eyes recently and a dark yellow orange color of her urine.  She has not had lightening of her stools. She has not had similar pains like this in the past.  She has no chest pain, shortness of breath. No previous episodes of jaundice or blood transfusion. She has never had easy bleeding or thromboembolic complications in her legs. She has never had a colonoscopy upon turning 49. She walks for exercise and does not have shortness of breath or chest pain with her usual activities. PHYSICAL EXAMINATION  GENERAL:  She is an ill-appearing woman in mild distress. VITAL SIGNS:  Blood pressure most recently is 165/83, pulse of 97, temperature is 98.4. There were no fevers earlier in the emergency room. Respiratory rate is 18 with 93% oxygen saturation on room air. Her listed weight is 150 pounds. HEAD AND NECK:  She does have obvious Scleral icterus. Open mouth exam: Membranes are dry perhaps. The uvula can be seen. There were no oral lesions. NECK:  Supple, without adenopathy. LUNGS:  Clear. CARDIOVASCULAR:  Rate is rapid and regular. ABDOMEN:  Shows no surgical scars. She does have some mild tenderness in the epigastrium in the right upper quadrant. I do not appreciate preperitoneal findings. There are no palpable masses or hernia bulges. EXTREMITIES:  Lower extremities show no peripheral edema. NEUROLOGIC:  Nonfocal.    LABORATORY DATA:  I have mentioned already above past liver function tests from 03/2010 do show a mild elevation in her ALT at 69. Bilirubin was normal at 0.9. There is also an elevated calcium and cholesterol from 2010 at 203. OTHER LABORATORY STUDIES: She had a chest x-ray in the emergency room. Impression was left basilar atelectasis versus early infiltrate and EKG was also done showing normal sinus rhythm with moderate voltage criteria for LVH.      ASSESSMENT AND PLAN: This is a woman with gallstone pancreatitis and cholelithiasis with elevated liver function tests and total bilirubin and I suspect she does have choledocholithiasis. I agree with Dr. Christian Guillen that a period of bowel rest and support is indicated prior to attempting any invasive procedures. I have written for ice chips for comfort for tonight and will make her n.p.o. after midnight in case any type of endoscopic procedure tomorrow is deemed necessary. I have increased the frequency of her small doses of morphine,2 mg, from every 3 hours to every 2 hours as needed. I have written for intravenous Pepcid twice daily and I have also ordered some intravenous Zofran as needed for nausea. I have added on her magnesium level to the morning labs that were done today and if this is low, we will supplement her. I have also drawn on the patient's bedside board her anatomy of the stomach, liver, gallbladder and pancreas and have explained to her with this drawing what we believe is going on. I have also described my opinion that surgical removal of her gallbladder prior to discharge from this hospitalization is necessary to prevent possible recurrence. We have also discussed laparoscopic versus open cholecystectomy along with the possible surgical complications of bleeding, infection, damage to structures in the right upper quadrant including the common bile duct as well as possible need to convert to an open operation and the possibility of postcholecystectomy diarrhea. With all this in mind, she gives me consent to proceed with cholecystectomy. No time for surgery has been offered yet. However, I would need to see her chemistries indicate resolution of her biliary obstruction and/or a preoperative cholangiogram prior to going to the operating room. If no preoperative  cholangiogram is done by gastroenterology service, then, an intraoperative cholangiogram would be performed by me at the time of cholecystectomy. Thank you for this consultation. ADDENDUM:   Magnesium level normal at 2.2.     MD Ashwin Hillman / Angel  D: 03/07/2018 16:25 T: 03/07/2018 17:27  JOB #: [id number]  CC: Jason Sales MD  CC: Jorge Martinez MD

## 2018-03-07 NOTE — IP AVS SNAPSHOT
[address] Patient: Kallie Dill MRN: [id number] A check napoleon indicates which time of day the medication should be taken. My Medications START taking these medications Instructions Each Dose to Equal  
 Morning Noon Evening Bedtime  
 cloNIDine HCl 0.1 mg tablet Commonly known as:  CATAPRES Your last dose was: Your next dose is: Take 1 Tab by mouth three (3) times daily as needed. SBP above 160  
 0.1 mg  
    
   
   
   
  
 docusate sodium 100 mg capsule Commonly known as:  Mozelle Clause Your last dose was: Your next dose is: Take 1 Cap by mouth two (2) times daily as needed for Constipation for up to 90 days. 100 mg  
    
   
   
   
  
 famotidine 20 mg tablet Commonly known as:  PEPCID Your last dose was: Your next dose is: Take 1 Tab by mouth two (2) times a day. Indications: Dyspepsia 20 mg HYDROcodone-acetaminophen 7.5-325 mg per tablet Commonly known as:  Sandy  Your last dose was: Your next dose is: Take 1-2 Tabs by mouth every four (4) hours as needed. Max Daily Amount: 12 Tabs. Indications: Pain 1-2 Tab  
    
   
   
   
  
 metoprolol tartrate 50 mg tablet Commonly known as:  LOPRESSOR Your last dose was: Your next dose is: Take 1 Tab by mouth every twelve (12) hours. 50 mg  
    
   
   
   
  
 ondansetron 4 mg disintegrating tablet Commonly known as:  ZOFRAN ODT Your last dose was: Your next dose is: Take 1 Tab by mouth every six (6) hours as needed. 4 mg Where to Get Your Medications Information on where to get these meds will be given to you by the nurse or doctor. ! Ask your nurse or doctor about these medications cloNIDine HCl 0.1 mg tablet  
 docusate sodium 100 mg capsule  
 famotidine 20 mg tablet HYDROcodone-acetaminophen 7.5-325 mg per tablet  
 metoprolol tartrate 50 mg tablet  
 ondansetron 4 mg disintegrating tablet

## 2018-03-08 ENCOUNTER — APPOINTMENT (OUTPATIENT)
Dept: GENERAL RADIOLOGY | Age: 73
DRG: 417 | End: 2018-03-08
Attending: FAMILY MEDICINE
Payer: MEDICARE

## 2018-03-08 ENCOUNTER — ANESTHESIA EVENT (OUTPATIENT)
Dept: ENDOSCOPY | Age: 73
DRG: 417 | End: 2018-03-08
Payer: MEDICARE

## 2018-03-08 ENCOUNTER — ANESTHESIA EVENT (OUTPATIENT)
Dept: SURGERY | Age: 73
DRG: 417 | End: 2018-03-08
Payer: MEDICARE

## 2018-03-08 ENCOUNTER — ANESTHESIA (OUTPATIENT)
Dept: ENDOSCOPY | Age: 73
DRG: 417 | End: 2018-03-08
Payer: MEDICARE

## 2018-03-08 LAB
ALBUMIN SERPL-MCNC: 2.8 G/DL (ref 3.4–5)
ALBUMIN/GLOB SERPL: 0.8 {RATIO} (ref 0.8–1.7)
ALP SERPL-CCNC: 517 U/L (ref 45–117)
ALT SERPL-CCNC: 261 U/L (ref 13–56)
ANION GAP SERPL CALC-SCNC: 10 MMOL/L (ref 3–18)
AST SERPL-CCNC: 135 U/L (ref 15–37)
BASOPHILS # BLD: 0 K/UL (ref 0–0.06)
BASOPHILS NFR BLD: 0 % (ref 0–3)
BILIRUB SERPL-MCNC: 8.4 MG/DL (ref 0.2–1)
BUN SERPL-MCNC: 12 MG/DL (ref 7–18)
BUN/CREAT SERPL: 16 (ref 12–20)
CALCIUM SERPL-MCNC: 8.3 MG/DL (ref 8.5–10.1)
CHLORIDE SERPL-SCNC: 97 MMOL/L (ref 100–108)
CO2 SERPL-SCNC: 23 MMOL/L (ref 21–32)
CREAT SERPL-MCNC: 0.74 MG/DL (ref 0.6–1.3)
DIFFERENTIAL METHOD BLD: ABNORMAL
EOSINOPHIL # BLD: 0 K/UL (ref 0–0.4)
EOSINOPHIL NFR BLD: 0 % (ref 0–5)
ERYTHROCYTE [DISTWIDTH] IN BLOOD BY AUTOMATED COUNT: 14.1 % (ref 11.6–14.5)
GLOBULIN SER CALC-MCNC: 3.3 G/DL (ref 2–4)
GLUCOSE SERPL-MCNC: 156 MG/DL (ref 74–99)
HCT VFR BLD AUTO: 33.3 % (ref 35–45)
HGB BLD-MCNC: 11.4 G/DL (ref 12–16)
LIPASE SERPL-CCNC: 2220 U/L (ref 73–393)
LYMPHOCYTES # BLD: 13.5 K/UL (ref 0.8–3.5)
LYMPHOCYTES NFR BLD: 50 % (ref 20–51)
MCH RBC QN AUTO: 28.9 PG (ref 24–34)
MCHC RBC AUTO-ENTMCNC: 34.2 G/DL (ref 31–37)
MCV RBC AUTO: 84.3 FL (ref 74–97)
MONOCYTES # BLD: 0.5 K/UL (ref 0–1)
MONOCYTES NFR BLD: 2 % (ref 2–9)
NEUTS BAND NFR BLD MANUAL: 1 % (ref 0–5)
NEUTS SEG # BLD: 13 K/UL (ref 1.8–8)
NEUTS SEG NFR BLD: 47 % (ref 42–75)
PLATELET # BLD AUTO: 276 K/UL (ref 135–420)
PLATELET COMMENTS,PCOM: ABNORMAL
PMV BLD AUTO: 10.9 FL (ref 9.2–11.8)
POTASSIUM SERPL-SCNC: 3.2 MMOL/L (ref 3.5–5.5)
PROT SERPL-MCNC: 6.1 G/DL (ref 6.4–8.2)
RBC # BLD AUTO: 3.95 M/UL (ref 4.2–5.3)
RBC MORPH BLD: ABNORMAL
SODIUM SERPL-SCNC: 130 MMOL/L (ref 136–145)
WBC # BLD AUTO: 27 K/UL (ref 4.6–13.2)

## 2018-03-08 PROCEDURE — BF101ZZ FLUOROSCOPY OF BILE DUCTS USING LOW OSMOLAR CONTRAST: ICD-10-PCS | Performed by: INTERNAL MEDICINE

## 2018-03-08 PROCEDURE — 80053 COMPREHEN METABOLIC PANEL: CPT | Performed by: INTERNAL MEDICINE

## 2018-03-08 PROCEDURE — 65270000029 HC RM PRIVATE

## 2018-03-08 PROCEDURE — 0FJB8ZZ INSPECTION OF HEPATOBILIARY DUCT, VIA NATURAL OR ARTIFICIAL OPENING ENDOSCOPIC: ICD-10-PCS | Performed by: INTERNAL MEDICINE

## 2018-03-08 PROCEDURE — 77030007288 HC DEV LOK BILI BSC -A: Performed by: INTERNAL MEDICINE

## 2018-03-08 PROCEDURE — 74011250636 HC RX REV CODE- 250/636: Performed by: EMERGENCY MEDICINE

## 2018-03-08 PROCEDURE — 74011000250 HC RX REV CODE- 250

## 2018-03-08 PROCEDURE — 74011000250 HC RX REV CODE- 250: Performed by: EMERGENCY MEDICINE

## 2018-03-08 PROCEDURE — 83690 ASSAY OF LIPASE: CPT | Performed by: INTERNAL MEDICINE

## 2018-03-08 PROCEDURE — 74011250636 HC RX REV CODE- 250/636

## 2018-03-08 PROCEDURE — 74011636320 HC RX REV CODE- 636/320: Performed by: INTERNAL MEDICINE

## 2018-03-08 PROCEDURE — 76060000032 HC ANESTHESIA 0.5 TO 1 HR: Performed by: INTERNAL MEDICINE

## 2018-03-08 PROCEDURE — 36415 COLL VENOUS BLD VENIPUNCTURE: CPT | Performed by: INTERNAL MEDICINE

## 2018-03-08 PROCEDURE — 76040000007: Performed by: INTERNAL MEDICINE

## 2018-03-08 PROCEDURE — 77030018846 HC SOL IRR STRL H20 ICUM -A: Performed by: INTERNAL MEDICINE

## 2018-03-08 PROCEDURE — 77030012596 HC SPHNTOM BILI BSC -E: Performed by: INTERNAL MEDICINE

## 2018-03-08 PROCEDURE — 74011250636 HC RX REV CODE- 250/636: Performed by: ANESTHESIOLOGY

## 2018-03-08 PROCEDURE — 74011000258 HC RX REV CODE- 258: Performed by: SURGERY

## 2018-03-08 PROCEDURE — 85025 COMPLETE CBC W/AUTO DIFF WBC: CPT | Performed by: INTERNAL MEDICINE

## 2018-03-08 PROCEDURE — 77030011640 HC PAD GRND REM COVD -A: Performed by: INTERNAL MEDICINE

## 2018-03-08 PROCEDURE — 74011250636 HC RX REV CODE- 250/636: Performed by: SURGERY

## 2018-03-08 PROCEDURE — 74011000250 HC RX REV CODE- 250: Performed by: SURGERY

## 2018-03-08 PROCEDURE — 77030009038 HC CATH BILI STN RTVR BSC -C: Performed by: INTERNAL MEDICINE

## 2018-03-08 RX ORDER — SODIUM CHLORIDE 0.9 % (FLUSH) 0.9 %
5-10 SYRINGE (ML) INJECTION AS NEEDED
Status: CANCELLED | OUTPATIENT
Start: 2018-03-08

## 2018-03-08 RX ORDER — PROPOFOL 10 MG/ML
INJECTION, EMULSION INTRAVENOUS AS NEEDED
Status: DISCONTINUED | OUTPATIENT
Start: 2018-03-08 | End: 2018-03-08 | Stop reason: HOSPADM

## 2018-03-08 RX ORDER — PROPOFOL 10 MG/ML
INJECTION, EMULSION INTRAVENOUS
Status: DISCONTINUED | OUTPATIENT
Start: 2018-03-08 | End: 2018-03-08 | Stop reason: HOSPADM

## 2018-03-08 RX ORDER — SODIUM CHLORIDE 0.9 % (FLUSH) 0.9 %
5-10 SYRINGE (ML) INJECTION EVERY 8 HOURS
Status: DISCONTINUED | OUTPATIENT
Start: 2018-03-08 | End: 2018-03-08 | Stop reason: HOSPADM

## 2018-03-08 RX ORDER — SODIUM CHLORIDE 0.9 % (FLUSH) 0.9 %
5-10 SYRINGE (ML) INJECTION AS NEEDED
Status: DISCONTINUED | OUTPATIENT
Start: 2018-03-08 | End: 2018-03-08 | Stop reason: HOSPADM

## 2018-03-08 RX ORDER — DEXTROSE MONOHYDRATE AND SODIUM CHLORIDE 5; .9 G/100ML; G/100ML
50 INJECTION, SOLUTION INTRAVENOUS CONTINUOUS
Status: DISCONTINUED | OUTPATIENT
Start: 2018-03-08 | End: 2018-03-10

## 2018-03-08 RX ORDER — SODIUM CHLORIDE, SODIUM LACTATE, POTASSIUM CHLORIDE, CALCIUM CHLORIDE 600; 310; 30; 20 MG/100ML; MG/100ML; MG/100ML; MG/100ML
75 INJECTION, SOLUTION INTRAVENOUS CONTINUOUS
Status: DISCONTINUED | OUTPATIENT
Start: 2018-03-08 | End: 2018-03-08 | Stop reason: HOSPADM

## 2018-03-08 RX ORDER — ONDANSETRON 2 MG/ML
4 INJECTION INTRAMUSCULAR; INTRAVENOUS ONCE
Status: CANCELLED | OUTPATIENT
Start: 2018-03-08 | End: 2018-03-08

## 2018-03-08 RX ORDER — SODIUM CHLORIDE 0.9 % (FLUSH) 0.9 %
5-10 SYRINGE (ML) INJECTION EVERY 8 HOURS
Status: CANCELLED | OUTPATIENT
Start: 2018-03-08

## 2018-03-08 RX ORDER — FENTANYL CITRATE 50 UG/ML
50 INJECTION, SOLUTION INTRAMUSCULAR; INTRAVENOUS
Status: CANCELLED | OUTPATIENT
Start: 2018-03-08

## 2018-03-08 RX ORDER — DIPHENHYDRAMINE HYDROCHLORIDE 50 MG/ML
12.5 INJECTION, SOLUTION INTRAMUSCULAR; INTRAVENOUS
Status: DISCONTINUED | OUTPATIENT
Start: 2018-03-08 | End: 2018-03-11 | Stop reason: HOSPADM

## 2018-03-08 RX ORDER — INSULIN LISPRO 100 [IU]/ML
INJECTION, SOLUTION INTRAVENOUS; SUBCUTANEOUS ONCE
Status: DISCONTINUED | OUTPATIENT
Start: 2018-03-08 | End: 2018-03-08 | Stop reason: HOSPADM

## 2018-03-08 RX ORDER — LIDOCAINE HYDROCHLORIDE 20 MG/ML
INJECTION, SOLUTION EPIDURAL; INFILTRATION; INTRACAUDAL; PERINEURAL AS NEEDED
Status: DISCONTINUED | OUTPATIENT
Start: 2018-03-08 | End: 2018-03-08 | Stop reason: HOSPADM

## 2018-03-08 RX ADMIN — DIPHENHYDRAMINE HYDROCHLORIDE 12.5 MG: 50 INJECTION, SOLUTION INTRAMUSCULAR; INTRAVENOUS at 10:23

## 2018-03-08 RX ADMIN — DIPHENHYDRAMINE HYDROCHLORIDE 12.5 MG: 50 INJECTION, SOLUTION INTRAMUSCULAR; INTRAVENOUS at 23:05

## 2018-03-08 RX ADMIN — SODIUM CHLORIDE 20 MG: 9 INJECTION INTRAMUSCULAR; INTRAVENOUS; SUBCUTANEOUS at 08:38

## 2018-03-08 RX ADMIN — SODIUM CHLORIDE, SODIUM LACTATE, POTASSIUM CHLORIDE, AND CALCIUM CHLORIDE 75 ML/HR: 600; 310; 30; 20 INJECTION, SOLUTION INTRAVENOUS at 13:25

## 2018-03-08 RX ADMIN — Medication 10 ML: at 15:35

## 2018-03-08 RX ADMIN — METRONIDAZOLE 500 MG: 500 INJECTION, SOLUTION INTRAVENOUS at 18:22

## 2018-03-08 RX ADMIN — METRONIDAZOLE 500 MG: 500 INJECTION, SOLUTION INTRAVENOUS at 08:54

## 2018-03-08 RX ADMIN — Medication 2 MG: at 05:03

## 2018-03-08 RX ADMIN — METRONIDAZOLE 500 MG: 500 INJECTION, SOLUTION INTRAVENOUS at 01:30

## 2018-03-08 RX ADMIN — Medication 2 MG: at 15:44

## 2018-03-08 RX ADMIN — LIDOCAINE HYDROCHLORIDE 60 MG: 20 INJECTION, SOLUTION EPIDURAL; INFILTRATION; INTRACAUDAL; PERINEURAL at 14:06

## 2018-03-08 RX ADMIN — Medication 2 MG: at 23:17

## 2018-03-08 RX ADMIN — DEXTROSE MONOHYDRATE AND SODIUM CHLORIDE 100 ML/HR: 5; .9 INJECTION, SOLUTION INTRAVENOUS at 21:36

## 2018-03-08 RX ADMIN — PROPOFOL 20 MG: 10 INJECTION, EMULSION INTRAVENOUS at 14:13

## 2018-03-08 RX ADMIN — PROPOFOL 20 MG: 10 INJECTION, EMULSION INTRAVENOUS at 14:12

## 2018-03-08 RX ADMIN — Medication 2 MG: at 21:21

## 2018-03-08 RX ADMIN — SODIUM CHLORIDE 20 MG: 9 INJECTION INTRAMUSCULAR; INTRAVENOUS; SUBCUTANEOUS at 21:21

## 2018-03-08 RX ADMIN — POTASSIUM CHLORIDE: 2 INJECTION, SOLUTION, CONCENTRATE INTRAVENOUS at 12:30

## 2018-03-08 RX ADMIN — POTASSIUM CHLORIDE: 2 INJECTION, SOLUTION, CONCENTRATE INTRAVENOUS at 11:21

## 2018-03-08 RX ADMIN — PROPOFOL 50 MCG/KG/MIN: 10 INJECTION, EMULSION INTRAVENOUS at 14:14

## 2018-03-08 RX ADMIN — DIPHENHYDRAMINE HYDROCHLORIDE 12.5 MG: 50 INJECTION, SOLUTION INTRAMUSCULAR; INTRAVENOUS at 18:23

## 2018-03-08 RX ADMIN — POTASSIUM CHLORIDE: 2 INJECTION, SOLUTION, CONCENTRATE INTRAVENOUS at 13:46

## 2018-03-08 RX ADMIN — Medication 2 MG: at 01:30

## 2018-03-08 RX ADMIN — Medication 2 G: at 01:36

## 2018-03-08 RX ADMIN — DEXTROSE MONOHYDRATE AND SODIUM CHLORIDE 100 ML/HR: 5; .9 INJECTION, SOLUTION INTRAVENOUS at 08:55

## 2018-03-08 RX ADMIN — POTASSIUM CHLORIDE: 2 INJECTION, SOLUTION, CONCENTRATE INTRAVENOUS at 15:20

## 2018-03-08 RX ADMIN — Medication 2 G: at 08:36

## 2018-03-08 RX ADMIN — Medication 2 G: at 18:18

## 2018-03-08 RX ADMIN — PROPOFOL 70 MG: 10 INJECTION, EMULSION INTRAVENOUS at 14:11

## 2018-03-08 NOTE — PROGRESS NOTES
Spoke with surgeon, Dr. Angeles Weber, regarding patients procedure for tomorrow and he has verbalized that patient will go first thing in the morning at approximately 730 am. Consent form already signed and in chart, patient has been notified.

## 2018-03-08 NOTE — PROGRESS NOTES
Progress Note    Patient: Darrius Jaime MRN: [id number]  SSN: [id number]    YOB: 1945  Age: 67 y.o. Sex: female      Admit Date: 3/7/2018    LOS: 1 day     Subjective:     Patient admitted with acute cholelithiasis with gallstone pancreatitis. Feeling much better today. Objective:     Vitals:    03/08/18 1435 03/08/18 1444 03/08/18 1454 03/08/18 1600   BP: 152/72 162/76 157/72 175/70   Pulse: 100 (!) 102 95 91   Resp: 16 17 22 21   Temp: 97.4 °F (36.3 °C)   99 °F (37.2 °C)   SpO2: 100% 100% 96% 96%   Weight:       Height:            Intake and Output:  Current Shift: 03/08 0701 - 03/08 1900  In: 100 [I.V.:100]  Out: 500 [Urine:500]  Last three shifts: 03/06 1901 - 03/08 0700  In: -   Out: 100 [Urine:100]    Physical Exam:   GENERAL: alert, cooperative, mild distress, appears stated age  LUNG: clear to auscultation bilaterally  HEART: regular rate and rhythm, S1, S2 normal, no murmur, click, rub or gallop  ABDOMEN: abnormal findings:  tenderness mild in the RUQ    Lab/Data Review: All lab results for the last 24 hours reviewed. Lipase 2200   Wbc 27    Assessment:     Active Problems:    Cholangitis (3/7/2018)      Choledocholithiasis (3/7/2018)      Cholelithiases (3/7/2018)        Plan:     Npo after midnight.     Signed By: Bridger Guardado MD     March 8, 2018

## 2018-03-08 NOTE — PROGRESS NOTES
met with patient, completed the initial Spiritual Assessment of the patient, and offered Pastoral Care, see flow sheets for interventions. Patient said she is doing all right. She is  and also has a son who lives in [place]. She is not affiliated with a [employer] at this time. Pastoral support provided. Patient does not have any Sabianism/cultural needs that will affect patients preferences in health care. Chart reviewed. Chaplains will continue to follow and will provide pastoral care on an as needed/as requested basis. Joseph Cloud MDiv.   Board Certified [employer] [phone]

## 2018-03-08 NOTE — PROGRESS NOTES
Care Management Interventions  Current Support Network: Lives with Spouse Ivelisse Dennis spouse [phone])  Confirm Follow Up Transport: Family    67 yr old female admit with cholangitis. Patient states that she lives with her spouse and verified home address. Patient states that she is independent with all her ADL's and denies DME/home /[facility] and states no hospitalizations in the last 30 days. Patient verbalized no needs at this time, will remain available.

## 2018-03-08 NOTE — PERIOP NOTES
TRANSFER - OUT REPORT:    Verbal report given to Chastity Blankenship RN(name) on Bin Goss  being transferred to [address] [place] [address](unit) for routine post - op       Report consisted of patients Situation, Background, Assessment and   Recommendations(SBAR). Information from the following report(s) Procedure Summary was reviewed with the receiving nurse. Lines:   Peripheral IV 03/07/18 Left Antecubital (Active)   Site Assessment Clean, dry, & intact 3/8/2018  9:06 AM   Phlebitis Assessment 0 3/8/2018  9:06 AM   Infiltration Assessment 0 3/8/2018  9:06 AM   Dressing Status Clean, dry, & intact 3/8/2018  9:06 AM   Dressing Type Transparent;Tape 3/8/2018  9:06 AM   Hub Color/Line Status Pink 3/8/2018  9:06 AM       Peripheral IV 03/08/18 Right Hand (Active)   Site Assessment Clean, dry, & intact 3/8/2018  1:20 PM   Phlebitis Assessment 0 3/8/2018  1:20 PM   Infiltration Assessment 0 3/8/2018  1:20 PM   Dressing Status Clean, dry, & intact 3/8/2018  1:20 PM   Dressing Type Tape;Transparent 3/8/2018  1:20 PM   Hub Color/Line Status Pink; Infusing 3/8/2018  1:20 PM        Opportunity for questions and clarification was provided.       Patient transported with:   [employer]

## 2018-03-08 NOTE — PROGRESS NOTES
Progress Note    Patient: Ly Walter MRN: [id number]  SSN: [id number]    YOB: 1945  Age: 67 y.o. Sex: female      Admit Date: 3/7/2018    LOS: 1 day     Subjective:     Pain much better and decreased today. C/o pruritis, did get ice/water up until midnight and tolerated that fine. Says urinating fine. Say a RN came by this AM and said she was going to have a procedure today. ???? I called OR and they know nothing about ERCP for today. Objective:     Vitals:    03/07/18 2343 03/08/18 0400 03/08/18 0600 03/08/18 0822   BP: 138/81 134/82  159/72   Pulse: 82 84  86   Resp: 18 18 18   Temp: 97.9 °F (36.6 °C) 98 °F (36.7 °C)  98.5 °F (36.9 °C)   SpO2: 95% 96%  96%   Weight:   68.4 kg (150 lb 14.4 oz)         Intake and Output:  Current Shift: 03/08 0701 - 03/08 1900  In: -   Out: 200 [Urine:200]  Last three shifts: 03/06 1901 - 03/08 0700  In: -   Out: 100 [Urine:100]    Physical Exam:   Looks ok, jaundiced slightly  Abdomen soft and much less tender. Lab/Data Review:  Recent Results (from the past 12 hour(s))   LIPASE    Collection Time: 03/08/18  3:48 AM   Result Value Ref Range    Lipase 2220 (H) 73 - 311 U/L   METABOLIC PANEL, COMPREHENSIVE    Collection Time: 03/08/18  3:48 AM   Result Value Ref Range    Sodium 130 (L) 136 - 145 mmol/L    Potassium 3.2 (L) 3.5 - 5.5 mmol/L    Chloride 97 (L) 100 - 108 mmol/L    CO2 23 21 - 32 mmol/L    Anion gap 10 3.0 - 18 mmol/L    Glucose 156 (H) 74 - 99 mg/dL    BUN 12 7.0 - 18 MG/DL    Creatinine 0.74 0.6 - 1.3 MG/DL    BUN/Creatinine ratio 16 12 - 20      GFR est AA >60 >60 ml/min/1.73m2    GFR est non-AA >60 >60 ml/min/1.73m2    Calcium 8.3 (L) 8.5 - 10.1 MG/DL    Bilirubin, total 8.4 (H) 0.2 - 1.0 MG/DL    ALT (SGPT) 261 (H) 13 - 56 U/L    AST (SGOT) 135 (H) 15 - 37 U/L    Alk.  phosphatase 517 (H) 45 - 117 U/L    Protein, total 6.1 (L) 6.4 - 8.2 g/dL    Albumin 2.8 (L) 3.4 - 5.0 g/dL    Globulin 3.3 2.0 - 4.0 g/dL    A-G Ratio 0.8 0.8 - 1. 7     CBC WITH AUTOMATED DIFF    Collection Time: 03/08/18  3:48 AM   Result Value Ref Range    WBC 27.0 (H) 4.6 - 13.2 K/uL    RBC 3.95 (L) 4.20 - 5.30 M/uL    HGB 11.4 (L) 12.0 - 16.0 g/dL    HCT 33.3 (L) 35.0 - 45.0 %    MCV 84.3 74.0 - 97.0 FL    MCH 28.9 24.0 - 34.0 PG    MCHC 34.2 31.0 - 37.0 g/dL    RDW 14.1 11.6 - 14.5 %    PLATELET 731 253 - 199 K/uL    MPV 10.9 9.2 - 11.8 FL    NEUTROPHILS 47 42 - 75 %    BAND NEUTROPHILS 1 0 - 5 %    LYMPHOCYTES 50 20 - 51 %    MONOCYTES 2 2 - 9 %    EOSINOPHILS 0 0 - 5 %    BASOPHILS 0 0 - 3 %    ABS. NEUTROPHILS 13.0 (H) 1.8 - 8.0 K/UL    ABS. LYMPHOCYTES 13.5 (H) 0.8 - 3.5 K/UL    ABS. MONOCYTES 0.5 0 - 1.0 K/UL    ABS. EOSINOPHILS 0.0 0.0 - 0.4 K/UL    ABS. BASOPHILS 0.0 0.0 - 0.06 K/UL    DF MANUAL      PLATELET COMMENTS ADEQUATE PLATELETS      RBC COMMENTS NORMOCYTIC, NORMOCHROMIC            Assessment:     Active Problems:    Cholangitis (3/7/2018)      Choledocholithiasis (3/7/2018)      Cholelithiases (3/7/2018)    hyponatremia, hypochloremia, hypokalemia  Transaminases dropping, but total bilirubin rising. Clinically gallstone pancreatitis improving. Plan:     I changed D5 1/2 NS to D5NS and ordered 4 runs of KCL 10 meq's with lidocaine. Booked for OR tomorrow afternoon for Lap nona and IOC (if no ERCP today)    IV Benadryl PRN pruritis    CMP tomorrow.     Signed By: Arjun Perry MD     March 8, 2018

## 2018-03-08 NOTE — ANESTHESIA POSTPROCEDURE EVALUATION
Post-Anesthesia Evaluation and Assessment    Patient: Alexis Galvez MRN: [id number]  SSN: [id number]    YOB: 1945  Age: 67 y.o. Sex: female       Cardiovascular Function/Vital Signs  Visit Vitals    /72    Pulse 100    Temp 36.3 °C (97.4 °F)    Resp 16    Ht 5' 5\" (1.651 m)    Wt 68.4 kg (150 lb 14.4 oz)    SpO2 100%    Breastfeeding No    BMI 25.11 kg/m2       Patient is status post MAC anesthesia for Procedure(s):  ENDOSCOPIC RETROGRADE CHOLANGIOPANCREATOGRAPHY (ERCP) w balloon dilation. Nausea/Vomiting: None    Postoperative hydration reviewed and adequate. Pain:  Pain Scale 1: Numeric (0 - 10) (03/08/18 1435)  Pain Intensity 1: 0 (03/08/18 1435)   Managed    Neurological Status: At baseline    Mental Status and Level of Consciousness: Arousable    Pulmonary Status:   O2 Device: Room air (03/08/18 1447)   Adequate oxygenation and airway patent    Complications related to anesthesia: None    Post-anesthesia assessment completed.  No concerns      Signed By: Arturo Basurto MD     March 8, 2018

## 2018-03-08 NOTE — PROCEDURES
[facility]  [facility], [address]    ERCP Procedure Note    Patient: Alexis Galvez MRN: [id number]  SSN: [id number]    YOB: 1945  Age: 67 y.o. Sex: female        Date/Time:  3/8/2018 2:28 PM    Impression:  -lacerated ampulla of vater from passed common bile duct stone with no residual stones seen on balloon cholangioscopy    Recommendations:  High fiber diet. , clear liquid diet npo after midnight  , for lap nona , available as needed for questions will sign off    Procedure Type:   ERCP--diagnostic     Indications: common bile duct stone  Pre-operative Diagnosis: see indication above  Post-operative Diagnosis:  See findings below  : Enrico Salvador MD  Referring Provider:    Sandhya Holcomb MD    Exam:  Airway: clear, no airway problems anticipated  Heart: RRR, without gallops or rubs  Lungs: clear bilaterally without wheezes, crackles, or rhonchi  Abdomen: soft, nontender, nondistended, bowel sounds present  Mental Status: awake, alert and oriented to person, place and time    Sedation:  MAC anesthesia Propofol  Procedure Details:  After informed consent was obtained with all risks and benefits of procedure explained, the patient was taken to the fluoroscopy suite and placed in the prone position. Upon sequential sedation as per above, the Olympus duodenoscope [id number]   was inserted via the mouthpeice and carefully advanced to the second portion of the duodenum. The quality of visualization was excellent. The duodenoscope was withdrawn into a short position. Findings:   Endoscopic: -normal esophagus, stomach, and duodenum  Ampulla:lacerated ampulla from passed common bile duct stone   Cholangiogram: -normal intra- and extrahepatic biliary tree  Pancreatogram:not performed    Specimen Removed:  None    Complications: None. EBL:  None. Interventions:    Pancreatic: none  Biliary: none        Discharge Disposition:   To [facility] following recovery in Endoscopy    Kush Rangel MD

## 2018-03-08 NOTE — PROGRESS NOTES
Surgeon  I noted results of today's EGD/ERC by Dr. Sindhu Nieto. Pt is scheduled for Lap cholecystectomy tomorrow afternoon if all is well. Will check a CMP and lipase in AM.  Also, no intraoperative cholangiogram will be necessary (I called the OR) since today's study was normal. She apparently had passed the common duct stone.

## 2018-03-08 NOTE — PROGRESS NOTES
Primary Nurse Gato Duong RN and Kiki Thomas RN performed a dual skin assessment on this patient No impairment noted  Lasha score is 20

## 2018-03-08 NOTE — PROGRESS NOTES
[email or web address]  [phone]       Impression   1. Abdominal pain- resolved  -lipase 16766  2. Jaundice  -ultrasound- pending  -Ct abdomen- Severe intrahepatic and extrahepatic biliary dilatation which is most likely due  to choledocholithiasis at the level of the ampulla. There is also extensive cholelithiasis in the gallbladder which is moderately distended but without  definite adjacent inflammatory change. 3. Elevated LFTs/BILI-  4. Leukocytosis-       Plan:  1. Cont NPO. For ERCP today  2.  Cont supportive care    Chief Complaint:abdomianl pain     Subjective:  Symptoms improved        Eyes: conjunctiva normal, EOM normal   ENT: Mucous membranes moist, no lesion or thrush   Cardiovascular:  normal rate and regular rhythm, no murmur   Pulmonary/Chest Wall: breath sounds normal and effort normal   Abdominal:  soft, non-acute, non-tender, no appreciable mass or hepatosplenomegaly, no appreciable ascites       Visit Vitals    /72 (BP 1 Location: Left arm, BP Patient Position: At rest)    Pulse 86    Temp 98.5 °F (36.9 °C)    Resp 18    Wt 68.4 kg (150 lb 14.4 oz)    SpO2 96%    Breastfeeding No           Intake/Output Summary (Last 24 hours) at 03/08/18 1229  Last data filed at 03/08/18 0854   Gross per 24 hour   Intake                0 ml   Output              500 ml   Net             -500 ml       CBC w/Diff    Lab Results   Component Value Date/Time    WBC 27.0 (H) 03/08/2018 03:48 AM    RBC 3.95 (L) 03/08/2018 03:48 AM    HGB 11.4 (L) 03/08/2018 03:48 AM    HCT 33.3 (L) 03/08/2018 03:48 AM    MCV 84.3 03/08/2018 03:48 AM    MCH 28.9 03/08/2018 03:48 AM    MCHC 34.2 03/08/2018 03:48 AM    RDW 14.1 03/08/2018 03:48 AM     03/08/2018 03:48 AM    Lab Results   Component Value Date/Time    GRANS 47 03/08/2018 03:48 AM    LYMPH 50 03/08/2018 03:48 AM    EOS 0 03/08/2018 03:48 AM    BANDS 1 03/08/2018 03:48 AM    BASOS 0 03/08/2018 03:48 AM      Basic Metabolic Profile   Recent Labs 03/08/18   0348  03/07/18   0614   NA  130*  130*   K  3.2*  3.3*   CL  97*  95*   CO2  23  25   BUN  12  10   CA  8.3*  9.3   MG   --   2.2        Hepatic Function    Lab Results   Component Value Date/Time    ALB 2.8 (L) 03/08/2018 03:48 AM    TP 6.1 (L) 03/08/2018 03:48 AM     (H) 03/08/2018 03:48 AM    Lab Results   Component Value Date/Time    SGOT 135 (H) 03/08/2018 03:48 AM          Venancio Najjar, NP  [facility], [facility]  [email or web address]

## 2018-03-08 NOTE — ANESTHESIA PREPROCEDURE EVALUATION
Anesthetic History   No history of anesthetic complications            Review of Systems / Medical History  Patient summary reviewed and pertinent labs reviewed    Pulmonary  Within defined limits                 Neuro/Psych   Within defined limits           Cardiovascular                  Exercise tolerance: >4 METS     GI/Hepatic/Renal  Within defined limits              Endo/Other  Within defined limits           Other Findings   Comments: Documentation of current medication  Current medications obtained, documented and obtained? YES      Risk Factors for Postoperative nausea/vomiting:       History of postoperative nausea/vomiting? NO       Female? YES       Motion sickness? NO       Intended opioid administration for postoperative analgesia? YES      Smoking Abstinence:  Current Smoker? NO  Elective Surgery? YES  Seen preoperatively by anesthesiologist or proxy prior to day of surgery? YES  Pt abstained from smoking 24 hours prior to anesthesia?  YES    Preventive care/screening for High Blood Pressure:  Aged 18 years and older: YES  Screened for high blood pressure: YES  Patients with high blood pressure referred to primary care provider   for BP management: YES                     Physical Exam    Airway  Mallampati: II  TM Distance: 4 - 6 cm  Neck ROM: normal range of motion   Mouth opening: Normal     Cardiovascular    Rhythm: regular  Rate: normal         Dental  No notable dental hx       Pulmonary  Breath sounds clear to auscultation               Abdominal  GI exam deferred       Other Findings            Anesthetic Plan    ASA: 2  Anesthesia type: MAC          Induction: Intravenous  Anesthetic plan and risks discussed with: Patient

## 2018-03-09 ENCOUNTER — ANESTHESIA (OUTPATIENT)
Dept: SURGERY | Age: 73
DRG: 417 | End: 2018-03-09
Payer: MEDICARE

## 2018-03-09 PROBLEM — Z90.49 S/P LAPAROSCOPIC CHOLECYSTECTOMY: Status: ACTIVE | Noted: 2018-03-09

## 2018-03-09 LAB
ALBUMIN SERPL-MCNC: 2.7 G/DL (ref 3.4–5)
ALBUMIN/GLOB SERPL: 0.8 {RATIO} (ref 0.8–1.7)
ALP SERPL-CCNC: 465 U/L (ref 45–117)
ALT SERPL-CCNC: 169 U/L (ref 13–56)
ANION GAP SERPL CALC-SCNC: 10 MMOL/L (ref 3–18)
ANION GAP SERPL CALC-SCNC: 9 MMOL/L (ref 3–18)
AST SERPL-CCNC: 56 U/L (ref 15–37)
BILIRUB SERPL-MCNC: 7.2 MG/DL (ref 0.2–1)
BUN SERPL-MCNC: 8 MG/DL (ref 7–18)
BUN SERPL-MCNC: 8 MG/DL (ref 7–18)
BUN/CREAT SERPL: 10 (ref 12–20)
BUN/CREAT SERPL: 13 (ref 12–20)
CALCIUM SERPL-MCNC: 8.6 MG/DL (ref 8.5–10.1)
CALCIUM SERPL-MCNC: 8.9 MG/DL (ref 8.5–10.1)
CHLORIDE SERPL-SCNC: 101 MMOL/L (ref 100–108)
CHLORIDE SERPL-SCNC: 98 MMOL/L (ref 100–108)
CK MB CFR SERPL CALC: 4.1 % (ref 0–4)
CK MB SERPL-MCNC: 2.8 NG/ML (ref 5–25)
CK SERPL-CCNC: 69 U/L (ref 26–192)
CO2 SERPL-SCNC: 23 MMOL/L (ref 21–32)
CO2 SERPL-SCNC: 25 MMOL/L (ref 21–32)
CREAT SERPL-MCNC: 0.64 MG/DL (ref 0.6–1.3)
CREAT SERPL-MCNC: 0.78 MG/DL (ref 0.6–1.3)
GLOBULIN SER CALC-MCNC: 3.2 G/DL (ref 2–4)
GLUCOSE SERPL-MCNC: 174 MG/DL (ref 74–99)
GLUCOSE SERPL-MCNC: 99 MG/DL (ref 74–99)
INR PPP: 1.1 (ref 0.8–1.2)
LIPASE SERPL-CCNC: 370 U/L (ref 73–393)
POTASSIUM SERPL-SCNC: 3 MMOL/L (ref 3.5–5.5)
POTASSIUM SERPL-SCNC: 3.6 MMOL/L (ref 3.5–5.5)
PROT SERPL-MCNC: 5.9 G/DL (ref 6.4–8.2)
PROTHROMBIN TIME: 13.9 SEC (ref 11.5–15.2)
SODIUM SERPL-SCNC: 133 MMOL/L (ref 136–145)
SODIUM SERPL-SCNC: 133 MMOL/L (ref 136–145)
TROPONIN I SERPL-MCNC: <0.02 NG/ML (ref 0–0.04)

## 2018-03-09 PROCEDURE — 85610 PROTHROMBIN TIME: CPT | Performed by: SURGERY

## 2018-03-09 PROCEDURE — 77030032490 HC SLV COMPR SCD KNE COVD -B: Performed by: SURGERY

## 2018-03-09 PROCEDURE — 88304 TISSUE EXAM BY PATHOLOGIST: CPT | Performed by: SURGERY

## 2018-03-09 PROCEDURE — 77030037892: Performed by: SURGERY

## 2018-03-09 PROCEDURE — 77030010513 HC APPL CLP LIG J&J -C: Performed by: SURGERY

## 2018-03-09 PROCEDURE — 74011250636 HC RX REV CODE- 250/636: Performed by: FAMILY MEDICINE

## 2018-03-09 PROCEDURE — 77030035048 HC TRCR ENDOSC OPTCL COVD -B: Performed by: SURGERY

## 2018-03-09 PROCEDURE — 77030003028 HC SUT VCRL J&J -A: Performed by: SURGERY

## 2018-03-09 PROCEDURE — 74011000250 HC RX REV CODE- 250: Performed by: EMERGENCY MEDICINE

## 2018-03-09 PROCEDURE — 74011250636 HC RX REV CODE- 250/636: Performed by: NURSE ANESTHETIST, CERTIFIED REGISTERED

## 2018-03-09 PROCEDURE — 74011250637 HC RX REV CODE- 250/637: Performed by: SURGERY

## 2018-03-09 PROCEDURE — 80048 BASIC METABOLIC PNL TOTAL CA: CPT | Performed by: STUDENT IN AN ORGANIZED HEALTH CARE EDUCATION/TRAINING PROGRAM

## 2018-03-09 PROCEDURE — 77030002933 HC SUT MCRYL J&J -A: Performed by: SURGERY

## 2018-03-09 PROCEDURE — 74011250636 HC RX REV CODE- 250/636

## 2018-03-09 PROCEDURE — 74011000250 HC RX REV CODE- 250: Performed by: SURGERY

## 2018-03-09 PROCEDURE — 77030012022 HC APPL CLP ENDOSC COVD -C: Performed by: SURGERY

## 2018-03-09 PROCEDURE — 77030026438 HC STYL ET INTUB CARD -A: Performed by: ANESTHESIOLOGY

## 2018-03-09 PROCEDURE — 74011250636 HC RX REV CODE- 250/636: Performed by: EMERGENCY MEDICINE

## 2018-03-09 PROCEDURE — 77030008683 HC TU ET CUF COVD -A: Performed by: ANESTHESIOLOGY

## 2018-03-09 PROCEDURE — 36415 COLL VENOUS BLD VENIPUNCTURE: CPT | Performed by: SURGERY

## 2018-03-09 PROCEDURE — 77030011640 HC PAD GRND REM COVD -A: Performed by: SURGERY

## 2018-03-09 PROCEDURE — 76210000016 HC OR PH I REC 1 TO 1.5 HR: Performed by: SURGERY

## 2018-03-09 PROCEDURE — 76010000149 HC OR TIME 1 TO 1.5 HR: Performed by: SURGERY

## 2018-03-09 PROCEDURE — 74011000258 HC RX REV CODE- 258: Performed by: SURGERY

## 2018-03-09 PROCEDURE — 82550 ASSAY OF CK (CPK): CPT | Performed by: STUDENT IN AN ORGANIZED HEALTH CARE EDUCATION/TRAINING PROGRAM

## 2018-03-09 PROCEDURE — 77030003580 HC NDL INSUF VERES J&J -B: Performed by: SURGERY

## 2018-03-09 PROCEDURE — 93005 ELECTROCARDIOGRAM TRACING: CPT

## 2018-03-09 PROCEDURE — 65270000029 HC RM PRIVATE

## 2018-03-09 PROCEDURE — 74011250636 HC RX REV CODE- 250/636: Performed by: SURGERY

## 2018-03-09 PROCEDURE — 0FT44ZZ RESECTION OF GALLBLADDER, PERCUTANEOUS ENDOSCOPIC APPROACH: ICD-10-PCS | Performed by: SURGERY

## 2018-03-09 PROCEDURE — C9290 INJ, BUPIVACAINE LIPOSOME: HCPCS | Performed by: SURGERY

## 2018-03-09 PROCEDURE — 74011000250 HC RX REV CODE- 250: Performed by: NURSE ANESTHETIST, CERTIFIED REGISTERED

## 2018-03-09 PROCEDURE — 77030035045 HC TRCR ENDOSC VRSPRT BLDLSS COVD -B: Performed by: SURGERY

## 2018-03-09 PROCEDURE — 77030020255 HC SOL INJ LR 1000ML BG: Performed by: SURGERY

## 2018-03-09 PROCEDURE — 74011000250 HC RX REV CODE- 250

## 2018-03-09 PROCEDURE — 83690 ASSAY OF LIPASE: CPT | Performed by: SURGERY

## 2018-03-09 PROCEDURE — 80053 COMPREHEN METABOLIC PANEL: CPT | Performed by: SURGERY

## 2018-03-09 PROCEDURE — 77030008771 HC TU NG SALEM SUMP -A: Performed by: ANESTHESIOLOGY

## 2018-03-09 PROCEDURE — 77030010507 HC ADH SKN DERMBND J&J -B: Performed by: SURGERY

## 2018-03-09 PROCEDURE — 77030034668 HC DEV CLOS PUNC DISP ANCH -B: Performed by: SURGERY

## 2018-03-09 PROCEDURE — 77030020782 HC GWN BAIR PAWS FLX 3M -B: Performed by: SURGERY

## 2018-03-09 PROCEDURE — 76060000033 HC ANESTHESIA 1 TO 1.5 HR: Performed by: SURGERY

## 2018-03-09 RX ORDER — MORPHINE SULFATE 2 MG/ML
4 INJECTION, SOLUTION INTRAMUSCULAR; INTRAVENOUS
Status: DISCONTINUED | OUTPATIENT
Start: 2018-03-09 | End: 2018-03-11 | Stop reason: HOSPADM

## 2018-03-09 RX ORDER — SODIUM CHLORIDE 0.9 % (FLUSH) 0.9 %
5-10 SYRINGE (ML) INJECTION AS NEEDED
Status: DISCONTINUED | OUTPATIENT
Start: 2018-03-09 | End: 2018-03-09 | Stop reason: HOSPADM

## 2018-03-09 RX ORDER — SODIUM CHLORIDE, SODIUM LACTATE, POTASSIUM CHLORIDE, CALCIUM CHLORIDE 600; 310; 30; 20 MG/100ML; MG/100ML; MG/100ML; MG/100ML
75 INJECTION, SOLUTION INTRAVENOUS CONTINUOUS
Status: DISCONTINUED | OUTPATIENT
Start: 2018-03-09 | End: 2018-03-09 | Stop reason: HOSPADM

## 2018-03-09 RX ORDER — GLYCOPYRROLATE 0.2 MG/ML
INJECTION INTRAMUSCULAR; INTRAVENOUS AS NEEDED
Status: DISCONTINUED | OUTPATIENT
Start: 2018-03-09 | End: 2018-03-09 | Stop reason: HOSPADM

## 2018-03-09 RX ORDER — FENTANYL CITRATE 50 UG/ML
INJECTION, SOLUTION INTRAMUSCULAR; INTRAVENOUS AS NEEDED
Status: DISCONTINUED | OUTPATIENT
Start: 2018-03-09 | End: 2018-03-09 | Stop reason: ALTCHOICE

## 2018-03-09 RX ORDER — KETOROLAC TROMETHAMINE 30 MG/ML
INJECTION, SOLUTION INTRAMUSCULAR; INTRAVENOUS
Status: DISPENSED
Start: 2018-03-09 | End: 2018-03-09

## 2018-03-09 RX ORDER — SODIUM CHLORIDE 9 MG/ML
1000 INJECTION, SOLUTION INTRAVENOUS ONCE
Status: DISPENSED | OUTPATIENT
Start: 2018-03-09 | End: 2018-03-10

## 2018-03-09 RX ORDER — HYDROCODONE BITARTRATE AND ACETAMINOPHEN 7.5; 325 MG/1; MG/1
1-2 TABLET ORAL
Status: DISCONTINUED | OUTPATIENT
Start: 2018-03-09 | End: 2018-03-11 | Stop reason: HOSPADM

## 2018-03-09 RX ORDER — LIDOCAINE HYDROCHLORIDE 20 MG/ML
INJECTION, SOLUTION EPIDURAL; INFILTRATION; INTRACAUDAL; PERINEURAL AS NEEDED
Status: DISCONTINUED | OUTPATIENT
Start: 2018-03-09 | End: 2018-03-09 | Stop reason: HOSPADM

## 2018-03-09 RX ORDER — MORPHINE SULFATE 2 MG/ML
2 INJECTION, SOLUTION INTRAMUSCULAR; INTRAVENOUS
Status: DISCONTINUED | OUTPATIENT
Start: 2018-03-09 | End: 2018-03-09 | Stop reason: HOSPADM

## 2018-03-09 RX ORDER — SODIUM CHLORIDE, SODIUM LACTATE, POTASSIUM CHLORIDE, CALCIUM CHLORIDE 600; 310; 30; 20 MG/100ML; MG/100ML; MG/100ML; MG/100ML
INJECTION, SOLUTION INTRAVENOUS
Status: DISCONTINUED | OUTPATIENT
Start: 2018-03-09 | End: 2018-03-09

## 2018-03-09 RX ORDER — PROPOFOL 10 MG/ML
INJECTION, EMULSION INTRAVENOUS AS NEEDED
Status: DISCONTINUED | OUTPATIENT
Start: 2018-03-09 | End: 2018-03-09 | Stop reason: HOSPADM

## 2018-03-09 RX ORDER — DOCUSATE SODIUM 100 MG/1
100 CAPSULE, LIQUID FILLED ORAL
Status: DISCONTINUED | OUTPATIENT
Start: 2018-03-09 | End: 2018-03-11 | Stop reason: HOSPADM

## 2018-03-09 RX ORDER — ONDANSETRON 2 MG/ML
4 INJECTION INTRAMUSCULAR; INTRAVENOUS AS NEEDED
Status: DISCONTINUED | OUTPATIENT
Start: 2018-03-09 | End: 2018-03-09 | Stop reason: HOSPADM

## 2018-03-09 RX ORDER — DEXAMETHASONE SODIUM PHOSPHATE 4 MG/ML
INJECTION, SOLUTION INTRA-ARTICULAR; INTRALESIONAL; INTRAMUSCULAR; INTRAVENOUS; SOFT TISSUE AS NEEDED
Status: DISCONTINUED | OUTPATIENT
Start: 2018-03-09 | End: 2018-03-09 | Stop reason: HOSPADM

## 2018-03-09 RX ORDER — NEOSTIGMINE METHYLSULFATE 5 MG/5 ML
SYRINGE (ML) INTRAVENOUS AS NEEDED
Status: DISCONTINUED | OUTPATIENT
Start: 2018-03-09 | End: 2018-03-09 | Stop reason: HOSPADM

## 2018-03-09 RX ORDER — KETOROLAC TROMETHAMINE 30 MG/ML
15 INJECTION, SOLUTION INTRAMUSCULAR; INTRAVENOUS ONCE
Status: COMPLETED | OUTPATIENT
Start: 2018-03-09 | End: 2018-03-09

## 2018-03-09 RX ORDER — ROCURONIUM BROMIDE 10 MG/ML
INJECTION, SOLUTION INTRAVENOUS AS NEEDED
Status: DISCONTINUED | OUTPATIENT
Start: 2018-03-09 | End: 2018-03-09 | Stop reason: HOSPADM

## 2018-03-09 RX ORDER — ONDANSETRON 2 MG/ML
INJECTION INTRAMUSCULAR; INTRAVENOUS AS NEEDED
Status: DISCONTINUED | OUTPATIENT
Start: 2018-03-09 | End: 2018-03-09 | Stop reason: HOSPADM

## 2018-03-09 RX ORDER — SUCCINYLCHOLINE CHLORIDE 20 MG/ML
INJECTION INTRAMUSCULAR; INTRAVENOUS AS NEEDED
Status: DISCONTINUED | OUTPATIENT
Start: 2018-03-09 | End: 2018-03-09 | Stop reason: HOSPADM

## 2018-03-09 RX ORDER — POTASSIUM CHLORIDE 20 MEQ/1
20 TABLET, EXTENDED RELEASE ORAL 2 TIMES DAILY
Status: COMPLETED | OUTPATIENT
Start: 2018-03-09 | End: 2018-03-09

## 2018-03-09 RX ORDER — HYDROMORPHONE HYDROCHLORIDE 2 MG/ML
0.2 INJECTION, SOLUTION INTRAMUSCULAR; INTRAVENOUS; SUBCUTANEOUS
Status: DISCONTINUED | OUTPATIENT
Start: 2018-03-09 | End: 2018-03-09

## 2018-03-09 RX ORDER — HYDROCODONE BITARTRATE AND ACETAMINOPHEN 7.5; 325 MG/1; MG/1
1-2 TABLET ORAL
Qty: 20 TAB | Refills: 0 | Status: SHIPPED | OUTPATIENT
Start: 2018-03-09 | End: 2022-10-13

## 2018-03-09 RX ORDER — HYDRALAZINE HYDROCHLORIDE 20 MG/ML
20 INJECTION INTRAMUSCULAR; INTRAVENOUS
Status: DISCONTINUED | OUTPATIENT
Start: 2018-03-09 | End: 2018-03-10

## 2018-03-09 RX ADMIN — DIPHENHYDRAMINE HYDROCHLORIDE 12.5 MG: 50 INJECTION, SOLUTION INTRAMUSCULAR; INTRAVENOUS at 02:38

## 2018-03-09 RX ADMIN — Medication 2 MG: at 09:24

## 2018-03-09 RX ADMIN — Medication 2 G: at 16:38

## 2018-03-09 RX ADMIN — HYDROCODONE BITARTRATE AND ACETAMINOPHEN 2 TABLET: 7.5; 325 TABLET ORAL at 22:06

## 2018-03-09 RX ADMIN — Medication 2 G: at 07:21

## 2018-03-09 RX ADMIN — PROPOFOL 100 MG: 10 INJECTION, EMULSION INTRAVENOUS at 07:42

## 2018-03-09 RX ADMIN — METRONIDAZOLE 500 MG: 500 INJECTION, SOLUTION INTRAVENOUS at 16:39

## 2018-03-09 RX ADMIN — SODIUM CHLORIDE 20 MG: 9 INJECTION INTRAMUSCULAR; INTRAVENOUS; SUBCUTANEOUS at 20:36

## 2018-03-09 RX ADMIN — METRONIDAZOLE 500 MG: 500 INJECTION, SOLUTION INTRAVENOUS at 07:21

## 2018-03-09 RX ADMIN — LIDOCAINE HYDROCHLORIDE 40 MG: 20 INJECTION, SOLUTION EPIDURAL; INFILTRATION; INTRACAUDAL; PERINEURAL at 07:42

## 2018-03-09 RX ADMIN — ROCURONIUM BROMIDE 20 MG: 10 INJECTION, SOLUTION INTRAVENOUS at 07:49

## 2018-03-09 RX ADMIN — ONDANSETRON 4 MG: 2 INJECTION INTRAMUSCULAR; INTRAVENOUS at 09:17

## 2018-03-09 RX ADMIN — SODIUM CHLORIDE 20 MG: 9 INJECTION INTRAMUSCULAR; INTRAVENOUS; SUBCUTANEOUS at 10:55

## 2018-03-09 RX ADMIN — Medication 2 G: at 01:49

## 2018-03-09 RX ADMIN — POTASSIUM CHLORIDE 20 MEQ: 20 TABLET, EXTENDED RELEASE ORAL at 17:02

## 2018-03-09 RX ADMIN — KETOROLAC TROMETHAMINE 15 MG: 30 INJECTION, SOLUTION INTRAMUSCULAR at 09:22

## 2018-03-09 RX ADMIN — Medication 2 MG: at 02:38

## 2018-03-09 RX ADMIN — FENTANYL CITRATE 50 MCG: 50 INJECTION, SOLUTION INTRAMUSCULAR; INTRAVENOUS at 08:05

## 2018-03-09 RX ADMIN — FENTANYL CITRATE 100 MCG: 50 INJECTION, SOLUTION INTRAMUSCULAR; INTRAVENOUS at 07:42

## 2018-03-09 RX ADMIN — GLYCOPYRROLATE 0.5 MG: 0.2 INJECTION INTRAMUSCULAR; INTRAVENOUS at 08:38

## 2018-03-09 RX ADMIN — DEXAMETHASONE SODIUM PHOSPHATE 4 MG: 4 INJECTION, SOLUTION INTRA-ARTICULAR; INTRALESIONAL; INTRAMUSCULAR; INTRAVENOUS; SOFT TISSUE at 07:49

## 2018-03-09 RX ADMIN — DIPHENHYDRAMINE HYDROCHLORIDE 12.5 MG: 50 INJECTION, SOLUTION INTRAMUSCULAR; INTRAVENOUS at 09:17

## 2018-03-09 RX ADMIN — METRONIDAZOLE 500 MG: 500 INJECTION, SOLUTION INTRAVENOUS at 01:49

## 2018-03-09 RX ADMIN — Medication 3 MG: at 08:38

## 2018-03-09 RX ADMIN — SODIUM CHLORIDE, SODIUM LACTATE, POTASSIUM CHLORIDE, AND CALCIUM CHLORIDE 75 ML/HR: 600; 310; 30; 20 INJECTION, SOLUTION INTRAVENOUS at 07:17

## 2018-03-09 RX ADMIN — ONDANSETRON 4 MG: 2 INJECTION INTRAMUSCULAR; INTRAVENOUS at 08:35

## 2018-03-09 RX ADMIN — Medication 2 MG: at 09:35

## 2018-03-09 RX ADMIN — FAMOTIDINE 20 MG: 10 INJECTION INTRAVENOUS at 07:18

## 2018-03-09 RX ADMIN — GLYCOPYRROLATE 0.3 MG: 0.2 INJECTION INTRAMUSCULAR; INTRAVENOUS at 08:50

## 2018-03-09 RX ADMIN — SUCCINYLCHOLINE CHLORIDE 100 MG: 20 INJECTION INTRAMUSCULAR; INTRAVENOUS at 07:42

## 2018-03-09 RX ADMIN — Medication 4 MG: at 21:08

## 2018-03-09 RX ADMIN — POTASSIUM CHLORIDE 20 MEQ: 20 TABLET, EXTENDED RELEASE ORAL at 11:03

## 2018-03-09 RX ADMIN — ROCURONIUM BROMIDE 5 MG: 10 INJECTION, SOLUTION INTRAVENOUS at 07:42

## 2018-03-09 RX ADMIN — Medication 2 MG: at 08:50

## 2018-03-09 RX ADMIN — DEXTROSE MONOHYDRATE AND SODIUM CHLORIDE 100 ML/HR: 5; .9 INJECTION, SOLUTION INTRAVENOUS at 10:51

## 2018-03-09 RX ADMIN — HYDRALAZINE HYDROCHLORIDE 20 MG: 20 INJECTION INTRAMUSCULAR; INTRAVENOUS at 20:36

## 2018-03-09 NOTE — PERIOP NOTES
TRANSFER - OUT REPORT:    Verbal report given to Diego STARKS(name) on Marius Crigler  being transferred to [address] [place] [address](unit) for routine post - op       Report consisted of patients Situation, Background, Assessment and   Recommendations(SBAR). Information from the following report(s) SBAR, Kardex, OR Summary, Procedure Summary, Intake/Output, MAR, Recent Results and Med Rec Status was reviewed with the receiving nurse. Lines:   Peripheral IV 03/09/18 Right Hand (Active)   Site Assessment Clean, dry, & intact 3/9/2018  8:59 AM   Phlebitis Assessment 0 3/9/2018  8:59 AM   Infiltration Assessment 0 3/9/2018  8:59 AM   Dressing Status Clean, dry, & intact 3/9/2018  8:59 AM   Dressing Type Tape;Transparent 3/9/2018  8:59 AM   Hub Color/Line Status Pink; Infusing 3/9/2018  8:59 AM        Opportunity for questions and clarification was provided.       Patient transported with:   [employer]

## 2018-03-09 NOTE — ANESTHESIA POSTPROCEDURE EVALUATION
Post-Anesthesia Evaluation and Assessment    Patient: Luis Alberto Martinez MRN: [id number]  SSN: [id number]    YOB: 1945  Age: 67 y.o. Sex: female      Data from PACU flowsheet    Cardiovascular Function/Vital Signs  Visit Vitals    /63    Pulse 96    Temp 36.6 °C (97.9 °F)    Resp 21    Ht 5' 5\" (1.651 m)    Wt 68.4 kg (150 lb 14.4 oz)    SpO2 98%    Breastfeeding No    BMI 25.11 kg/m2       Patient is status post general anesthesia for Procedure(s):  CHOLECYSTECTOMY LAPAROSCOPIC . Nausea/Vomiting: controlled    Postoperative hydration reviewed and adequate. Pain:  Pain Scale 1: Numeric (0 - 10) (03/09/18 1000)  Pain Intensity 1: 3 (03/09/18 1000)   Managed      Mental Status and Level of Consciousness: Alert and oriented     Pulmonary Status:   O2 Device: Room air (03/09/18 0913)   Adequate oxygenation and airway patent    Complications related to anesthesia: None    Post-anesthesia assessment completed.  No concerns    Signed By: Omayra Nolan MD     March 9, 2018

## 2018-03-09 NOTE — OP NOTES
[address] [place] [address]   OPERATIVE REPORT    Vick English  MR#: [id number]  : 1945  ACCOUNT #: [de-identified]   DATE OF SERVICE: 2018    PREOPERATIVE DIAGNOSES  1. Gallstone pancreatitis. 2.  Cholelithiasis. 3.  Choledocholithiasis with recent passage of stone. POSTOPERATIVE DIAGNOSES  1. Gallstone pancreatitis. 2.  Cholelithiasis. 3.  Choledocholithiasis with recent passage of stone. PROCEDURES PERFORMED:  Laparoscopic cholecystectomy. ESTIMATED BLOOD LOSS:  25 mL. SPECIMENS REMOVED:  Gallbladder and contents. IMPLANTS:  None. SURGEON:  Tomas Mitchell MD.     ASSISTANT:  Cecilio Melendez. COMPLICATIONS:  None. ANESTHESIA:  General endotracheal.     INDICATION FOR PROCEDURE:  The patient is a 65-year-old woman admitted on  with acute onset of abdominal pain. She was found to be jaundiced with a markedly dilated common bile duct on CT and ultrasound testing. Lab testing showed a bilirubin of 7.5 and there were markedly elevated transaminases. Her lipase was 29,000. The following day this had dropped to 2200 with a rise of the bilirubin. She underwent an ERCP by Dr. Maxwell Mac. This showed a laceration of her ampulla sphincter, which appeared to him to be from recent passage of a gallstone out of the common bile duct. His cholangiogram was negative for any additional stones within the duct. Her pain has markedly improved. Her total bilirubin today is down slightly to 7.2. Transaminases are falling and her lipase is 370. She has been taken to the operating room for removal of her gallbladder. DESCRIPTION OF PROCEDURE:  After consent was obtained, she was brought to the main operating room. She is currently on cefepime and Flagyl IV antibiotics on scheduled doses and these have been continued.   Once on the operating room table, sequential compression devices were applied and activated on lower extremities and general endotracheal anesthesia was administered. An orogastric tube was inserted. No Gamble catheter was placed, as she voided prior to coming to the operating room. Her abdomen is then prepped and draped in the usual sterile fashion. She was clinically jaundiced. A surgical timeout was completed. A small amount of long-acting liposome Marcaine was infiltrated just above her umbilicus. A small curvilinear incision was made and the Veress needle was inserted. Pneumoperitoneum to 15 mmHg was created. The Veress needle was removed and a 12 mm bladeless trocar was then inserted under Optiview guidance. Once within the abdomen, inspection showed no evidence of insertion trauma. She was then placed in a very steep reverse Trendelenburg position. Additional Exparel was infiltrated in the left upper quadrant, very high toward the epigastrium, as well as in the right upper quadrant in 2 places. Three 5 mm bladeless trocars were inserted. Inspection showed normal appearing liver. Her gallbladder was a bit edematous and yellow discolored from her hyperbilirubinemia. The fundus was put on cephalad stretch and the neck was put on lateral stretch. The hook cautery was used to open the peritoneum medially and laterally, as well as over the cystic duct. Careful dissection then showed the cystic duct, as well as the cystic artery just behind the node of Calot, which was edematous and hyperemic. Both of these tubular structures were clipped proximally, several times and once distally and transected. The gallbladder was then removed without incident from the bed of the liver using hook cautery. We switched back to the 5 mm 30 degree laparoscope and using an Endobag from the supraumbilical site, the gallbladder was placed within this and the gallbladder was extracted. Stretching of the fascial wall was needed, as well as slightly opening wider the skin incision for complete extraction. Palpation showed multiple gallstones within the gallbladder. It was submitted in formalin for pathological processing. A laparoscopic suturing device with 0 Vicryl suture was used to close the fascial defect and beneath the supraumbilical site, 3 or 4 interrupted stitches were needed. These were then tied. Further inspection of the bed of the liver showed no additional bleeding. The area was copiously irrigated and the irrigant was suctioned out. The remainder of the Exparel was infiltrated around this fascial point above the umbilicus. Pneumoperitoneum was then released by the remaining three 5 mm trocar sites and they too were removed. All subcutaneous tissues were irrigated with saline. All skin incisions closed with 4-0 Monocryl suture. Dermabond was used to dress the incision. She was awakened from anesthesia, extubated, and brought to the recovery room in excellent condition with no known complications. Again, blood loss was felt to be 25 mL and the specimen submitted was the gallbladder and its contents.       Dylan Nash MD       [address] / Clemencia Melendez  D: 03/09/2018 08:50     T: 03/09/2018 09:25  JOB #: [id number]  CC: Mei Judge MD

## 2018-03-09 NOTE — PROGRESS NOTES
Bedside shift change report given to [address] (oncoming nurse) by Cari President RN (offgoing nurse). Report included the following information SBAR. Pt is to be NPO after midnight for procedure in the morning.

## 2018-03-09 NOTE — PROGRESS NOTES
Called security as requested for patient to retrieve items form safe explained to pt staff and hospital will not bed responsible for any thief, misplaced, or destruction of personal property

## 2018-03-09 NOTE — ANESTHESIA PREPROCEDURE EVALUATION
Anesthetic History   No history of anesthetic complications            Review of Systems / Medical History  Patient summary reviewed and pertinent labs reviewed    Pulmonary  Within defined limits                 Neuro/Psych   Within defined limits           Cardiovascular                  Exercise tolerance: >4 METS     GI/Hepatic/Renal     GERD: well controlled           Endo/Other  Within defined limits           Other Findings   Comments: Documentation of current medication  Current medications obtained, documented and obtained? YES      Risk Factors for Postoperative nausea/vomiting:       History of postoperative nausea/vomiting? NO       Female? YES       Motion sickness? NO       Intended opioid administration for postoperative analgesia? YES      Smoking Abstinence:  Current Smoker? NO  Elective Surgery? YES  Seen preoperatively by anesthesiologist or proxy prior to day of surgery? YES  Pt abstained from smoking 24 hours prior to anesthesia?  YES    Preventive care/screening for High Blood Pressure:  Aged 18 years and older: YES  Screened for high blood pressure: YES  Patients with high blood pressure referred to primary care provider   for BP management: YES                     Physical Exam    Airway  Mallampati: II  TM Distance: 4 - 6 cm  Neck ROM: normal range of motion   Mouth opening: Normal     Cardiovascular    Rhythm: regular  Rate: normal         Dental  No notable dental hx       Pulmonary  Breath sounds clear to auscultation               Abdominal  GI exam deferred       Other Findings            Anesthetic Plan    ASA: 2  Anesthesia type: general          Induction: Intravenous  Anesthetic plan and risks discussed with: Patient

## 2018-03-09 NOTE — PROGRESS NOTES
Progress Note    Patient: Justina Vargas MRN: [id number]  SSN: [id number]    YOB: 1945  Age: 67 y.o. Sex: female      Admit Date: 3/7/2018    LOS: 2 days     Subjective:     Patient admitted with gallstone pancreatitis with cholelithiasis. Patient underwent cholecystectomy today. Objective:     Vitals:    03/09/18 1002 03/09/18 1007 03/09/18 1052 03/09/18 1215   BP: 178/63  (!) 169/94 196/84   Pulse: 96  91 96   Resp: 21 19 18   Temp:  97.9 °F (36.6 °C) 97.6 °F (36.4 °C) 97.3 °F (36.3 °C)   SpO2: 98%  94% 96%   Weight:       Height:            Intake and Output:  Current Shift: 03/09 0701 - 03/09 1900  In: 3734 [P.O.:240; I.V.:800]  Out: 1575 [Urine:1550]  Last three shifts: 03/07 1901 - 03/09 0700  In: 2478.3 [P.O.:360; I.V.:2118.3]  Out: 2600 [Urine:2600]    Physical Exam:   GENERAL: alert, cooperative, mild distress, appears older than stated age  LUNG: clear to auscultation bilaterally  HEART: regular rate and rhythm, S1, S2 normal, no murmur, click, rub or gallop  ABDOMEN: soft, non-tender. Bowel sounds normal. No masses,  no organomegaly    Lab/Data Review: All lab results for the last 24 hours reviewed. potassium 3.0  Blood culture negative    Assessment:     Active Problems:    Cholangitis (3/7/2018)      Choledocholithiasis (3/7/2018)      Cholelithiases (3/7/2018)      S/P laparoscopic cholecystectomy (3/9/2018)      Overview: Done 3/9/2018        Plan:     Post op orders  Replace potassium.     Signed By: Pro Cheema MD     March 9, 2018

## 2018-03-09 NOTE — ROUTINE PROCESS
Bedside and Verbal shift change report given to Aleta. Report included the following information SBAR, Kardex, Intake/Output, MAR and Recent Results.

## 2018-03-09 NOTE — PROGRESS NOTES
Problem: Falls - Risk of  Goal: *Absence of Falls  Document Trang Fall Risk and appropriate interventions in the flowsheet.    Outcome: Progressing Towards Goal  Fall Risk Interventions:            Medication Interventions: Teach patient to arise slowly

## 2018-03-09 NOTE — BRIEF OP NOTE
BRIEF OPERATIVE NOTE    Date of Procedure: 3/9/2018   Preoperative Diagnosis: gallstone pancreatitis, cholelithiasis, choledocholithiasis with recent passage of stone  Postoperative Diagnosis:   gallstone pancreatitis, cholelithiasis, choledocholithiasis with recent passage of stone  Procedure(s):  CHOLECYSTECTOMY LAPAROSCOPIC   Surgeon(s) and Role:     * Rivera Ambriz MD - Primary         Assistant Staff: None      Surgical Staff:  Circ-1: Radha Serna RN  Scrub Tech-1: Marco Manuel  Surg Asst-1: Alec Crowell  Event Time In   Incision Start 0754   Incision Close      Anesthesia: General   Estimated Blood Loss: 25 ml   800 ml iv fluids  Specimens: * No specimens in log *   Findings: edematous gallbladder containing palpable stones   Complications: none. Implants: * No implants in log *    OP note.  [id number]    Follow up made for 3/23/18 at 1030 in my office. I left a script for Norco 7.5/325 on the chart. From a surgical standpoint, her surgery was unremarkable and she is OK to be discharged at any time with no restrictions on diet. Keep incisions clean and dry for 24 hours, then ok to shower, no lifting > 15 lbs for two weeks and of course no driving while on narcotic pain medications.

## 2018-03-10 LAB
ALBUMIN SERPL-MCNC: 3 G/DL (ref 3.4–5)
ALBUMIN/GLOB SERPL: 0.8 {RATIO} (ref 0.8–1.7)
ALP SERPL-CCNC: 481 U/L (ref 45–117)
ALT SERPL-CCNC: 153 U/L (ref 13–56)
ANION GAP SERPL CALC-SCNC: 9 MMOL/L (ref 3–18)
AST SERPL-CCNC: 47 U/L (ref 15–37)
ATRIAL RATE: 121 BPM
BASOPHILS # BLD: 0 K/UL (ref 0–0.06)
BASOPHILS NFR BLD: 0 % (ref 0–3)
BILIRUB SERPL-MCNC: 5.6 MG/DL (ref 0.2–1)
BUN SERPL-MCNC: 10 MG/DL (ref 7–18)
BUN/CREAT SERPL: 14 (ref 12–20)
CALCIUM SERPL-MCNC: 8.9 MG/DL (ref 8.5–10.1)
CALCULATED P AXIS, ECG09: 64 DEGREES
CALCULATED R AXIS, ECG10: -5 DEGREES
CALCULATED T AXIS, ECG11: 33 DEGREES
CHLORIDE SERPL-SCNC: 101 MMOL/L (ref 100–108)
CK MB CFR SERPL CALC: 3.4 % (ref 0–4)
CK MB SERPL-MCNC: 3.7 NG/ML (ref 5–25)
CK SERPL-CCNC: 109 U/L (ref 26–192)
CO2 SERPL-SCNC: 23 MMOL/L (ref 21–32)
CREAT SERPL-MCNC: 0.74 MG/DL (ref 0.6–1.3)
DIAGNOSIS, 93000: NORMAL
DIFFERENTIAL METHOD BLD: ABNORMAL
EOSINOPHIL # BLD: 0 K/UL (ref 0–0.4)
EOSINOPHIL NFR BLD: 0 % (ref 0–5)
ERYTHROCYTE [DISTWIDTH] IN BLOOD BY AUTOMATED COUNT: 14.7 % (ref 11.6–14.5)
GLOBULIN SER CALC-MCNC: 3.6 G/DL (ref 2–4)
GLUCOSE SERPL-MCNC: 126 MG/DL (ref 74–99)
HBA1C MFR BLD: 5.1 % (ref 4.2–5.6)
HCT VFR BLD AUTO: 33.2 % (ref 35–45)
HGB BLD-MCNC: 11.7 G/DL (ref 12–16)
LYMPHOCYTES # BLD: 14.8 K/UL (ref 0.8–3.5)
LYMPHOCYTES NFR BLD: 58 % (ref 20–51)
MCH RBC QN AUTO: 29.3 PG (ref 24–34)
MCHC RBC AUTO-ENTMCNC: 35.2 G/DL (ref 31–37)
MCV RBC AUTO: 83 FL (ref 74–97)
MONOCYTES # BLD: 0.3 K/UL (ref 0–1)
MONOCYTES NFR BLD: 1 % (ref 2–9)
NEUTS BAND NFR BLD MANUAL: 1 % (ref 0–5)
NEUTS SEG # BLD: 10.5 K/UL (ref 1.8–8)
NEUTS SEG NFR BLD: 40 % (ref 42–75)
P-R INTERVAL, ECG05: 140 MS
PLATELET # BLD AUTO: 274 K/UL (ref 135–420)
PLATELET COMMENTS,PCOM: ABNORMAL
PMV BLD AUTO: 10.1 FL (ref 9.2–11.8)
POTASSIUM SERPL-SCNC: 3.5 MMOL/L (ref 3.5–5.5)
PROT SERPL-MCNC: 6.6 G/DL (ref 6.4–8.2)
Q-T INTERVAL, ECG07: 302 MS
QRS DURATION, ECG06: 74 MS
QTC CALCULATION (BEZET), ECG08: 428 MS
RBC # BLD AUTO: 4 M/UL (ref 4.2–5.3)
RBC MORPH BLD: ABNORMAL
SODIUM SERPL-SCNC: 133 MMOL/L (ref 136–145)
TROPONIN I SERPL-MCNC: <0.02 NG/ML (ref 0–0.04)
VENTRICULAR RATE, ECG03: 121 BPM
WBC # BLD AUTO: 25.6 K/UL (ref 4.6–13.2)
WBC MORPH BLD: ABNORMAL

## 2018-03-10 PROCEDURE — 65270000029 HC RM PRIVATE

## 2018-03-10 PROCEDURE — 74011250637 HC RX REV CODE- 250/637: Performed by: FAMILY MEDICINE

## 2018-03-10 PROCEDURE — 74011250636 HC RX REV CODE- 250/636: Performed by: SURGERY

## 2018-03-10 PROCEDURE — 74011000250 HC RX REV CODE- 250: Performed by: SURGERY

## 2018-03-10 PROCEDURE — 83036 HEMOGLOBIN GLYCOSYLATED A1C: CPT | Performed by: SURGERY

## 2018-03-10 PROCEDURE — 74011000250 HC RX REV CODE- 250: Performed by: EMERGENCY MEDICINE

## 2018-03-10 PROCEDURE — 80053 COMPREHEN METABOLIC PANEL: CPT | Performed by: FAMILY MEDICINE

## 2018-03-10 PROCEDURE — 74011250636 HC RX REV CODE- 250/636: Performed by: EMERGENCY MEDICINE

## 2018-03-10 PROCEDURE — 36415 COLL VENOUS BLD VENIPUNCTURE: CPT | Performed by: FAMILY MEDICINE

## 2018-03-10 PROCEDURE — 85025 COMPLETE CBC W/AUTO DIFF WBC: CPT | Performed by: FAMILY MEDICINE

## 2018-03-10 PROCEDURE — 82550 ASSAY OF CK (CPK): CPT | Performed by: FAMILY MEDICINE

## 2018-03-10 PROCEDURE — 74011250637 HC RX REV CODE- 250/637: Performed by: SURGERY

## 2018-03-10 RX ORDER — CLONIDINE HYDROCHLORIDE 0.1 MG/1
0.1 TABLET ORAL
Status: DISCONTINUED | OUTPATIENT
Start: 2018-03-10 | End: 2018-03-11 | Stop reason: HOSPADM

## 2018-03-10 RX ORDER — METOPROLOL TARTRATE 25 MG/1
25 TABLET, FILM COATED ORAL 2 TIMES DAILY
Status: DISCONTINUED | OUTPATIENT
Start: 2018-03-10 | End: 2018-03-11

## 2018-03-10 RX ORDER — FAMOTIDINE 20 MG/1
20 TABLET, FILM COATED ORAL 2 TIMES DAILY
Status: DISCONTINUED | OUTPATIENT
Start: 2018-03-10 | End: 2018-03-11 | Stop reason: HOSPADM

## 2018-03-10 RX ADMIN — METOPROLOL TARTRATE 25 MG: 25 TABLET ORAL at 17:43

## 2018-03-10 RX ADMIN — METOPROLOL TARTRATE 25 MG: 25 TABLET ORAL at 02:12

## 2018-03-10 RX ADMIN — CLONIDINE HYDROCHLORIDE 0.1 MG: 0.1 TABLET ORAL at 14:46

## 2018-03-10 RX ADMIN — DIPHENHYDRAMINE HYDROCHLORIDE 12.5 MG: 50 INJECTION, SOLUTION INTRAMUSCULAR; INTRAVENOUS at 00:11

## 2018-03-10 RX ADMIN — Medication 2 G: at 17:43

## 2018-03-10 RX ADMIN — METOPROLOL TARTRATE 25 MG: 25 TABLET ORAL at 09:04

## 2018-03-10 RX ADMIN — METRONIDAZOLE 500 MG: 500 INJECTION, SOLUTION INTRAVENOUS at 09:03

## 2018-03-10 RX ADMIN — SODIUM CHLORIDE 20 MG: 9 INJECTION INTRAMUSCULAR; INTRAVENOUS; SUBCUTANEOUS at 09:04

## 2018-03-10 RX ADMIN — FAMOTIDINE 20 MG: 20 TABLET ORAL at 10:00

## 2018-03-10 RX ADMIN — Medication 2 G: at 09:03

## 2018-03-10 RX ADMIN — METRONIDAZOLE 500 MG: 500 INJECTION, SOLUTION INTRAVENOUS at 17:43

## 2018-03-10 RX ADMIN — FAMOTIDINE 20 MG: 20 TABLET ORAL at 17:43

## 2018-03-10 RX ADMIN — METRONIDAZOLE 500 MG: 500 INJECTION, SOLUTION INTRAVENOUS at 02:12

## 2018-03-10 RX ADMIN — Medication 2 G: at 02:12

## 2018-03-10 NOTE — PROGRESS NOTES
RRT activated at 2112 due to elevated heart rate of 120 and pt anxious, tremulous and stated \" I feel like my heart is beating out of my chest\". EKG, cardiac enzymes, BMP obtained. 4mg iv morphine given for incisional pain. 200ml NS bolus given. Symptoms improved and RRT ended at 2146. Dr Allison Mendoza notified of RRT by [facility] MD. Will continue to monitor patient.

## 2018-03-10 NOTE — PROGRESS NOTES
Bedside and Verbal shift change report given to [employer] (oncoming nurse) by Natalia Purdy RN   (offgoing nurse). Report given with Lisbeth BLUE, MAR and Recent Results.        1930 Dr Chace Najera paged awaiting call back for patient elevated b/p

## 2018-03-10 NOTE — PROGRESS NOTES
Rapid Response Note  [facility]    Patient: Kendal Morales 67 y.o. female  [id number]  1945      Admit Date: 3/7/2018   Admission Diagnosis: Cholangitis  Choledocholithiasis  Cholelithiases  DX  dx    RAPID RESPONSE     Rapid response called for tachycardia, hypertension. RRT arrived and found patient lying in bed in no acute distress. Patient is POD #0 s/p laparoscopic cholecystectomy for Gallstone pancreatitis, Cholelithiasis and Choledocholithiasis. Per the nurse the patient started complaining of her heart \"racing out of her chest\" and she was tremulous. The palpitations are new for her, never experienced this in the past. Denied SOB. Patient also reported abdominal pain over the incision area that resembles gas pain and pain in the right shoulder. She was given 2 mg of morphine prior to the rapid. She was also hypertensive in the 200's/100's so she was given hydralazine prior to the rapid. Her vitals were 's/60's, , spO2 95% on room air and 97.4 F. Medications Reviewed    Review of Systems   Respiratory: Negative for shortness of breath. Cardiovascular: Positive for palpitations. Negative for chest pain. Gastrointestinal: Positive for abdominal pain. OBJECTIVE     Visit Vitals    BP (!) 218/104    Pulse 91    Temp 97.4 °F (36.3 °C)    Resp 18    Ht 5' 5\" (1.651 m)    Wt 68.4 kg (150 lb 14.4 oz)    SpO2 95%    Breastfeeding No    BMI 25.11 kg/m2       Physical Exam   Constitutional: She is oriented to person, place, and time. She appears well-developed and well-nourished. Cardiovascular: S1 normal, S2 normal and normal heart sounds. Tachycardia present. Exam reveals no gallop. No murmur heard. Pulmonary/Chest: Effort normal and breath sounds normal. No accessory muscle usage. No respiratory distress. She has no decreased breath sounds. She has no wheezes. She has no rhonchi. She has no rales. Abdominal: Soft.  Normal appearance and bowel sounds are normal. She exhibits no distension. There is no tenderness. There is no rigidity. Incisions were clear,dry and intact. No tenderness to palpation on the abdomen. Musculoskeletal: She exhibits no edema. Neurological: She is alert and oriented to person, place, and time. Skin: Skin is dry. Medications administered: 4 mg morphine, 200 Bolus NS     EKG: sinus tachycardia,     Labs: BMP, cardiac panel     ASSESSMENT, PLAN & DISPOSITION   David Barrow is a 67y.o. year old female admitted for Cholangitis, Choledocholithiasis, Cholelithiases. Rapid response called for tachycardia and hypertension. Patient is POD #0 s/p laparoscopic cholecystectomy for Gallstone pancreatitis, Cholelithiasis and Choledocholithiasis. Patient was noted to have hypertension in the 200's/100's for which she was given hydralazine and  the hypertension resolved. She was having palpitations without chest pain and EKG done showed sinus tachycardia with no ST elevations or depressions. Cardiac panel was ordered to assess for any new ACS. The abdominal pain resolved after receiving 4 mg of morphine. In her BMP from today she was found to have low levels of potassium and she was replaced today with K+ but BMP was ordered to assess her K levels. The hypertension and sinus tachycardia were likely caused by post op pain because after administration of pain med's patient's pain resolved. She has not had a BM and colace was started per attending recommendations. Patient condition currently: stable. Attending Dr. Victor Hugo Cain notified of rapid response. In agreement with plan. Primary team resuming care.        Navdeep Muhammad MD, PGY-1  [facility] Medicine  [facility]  03/09/18 9:34 PM

## 2018-03-10 NOTE — PROGRESS NOTES
While rounding on patient at 0130, pt stated she thinks her tachycardic episode may have been caused by the iv hydralazine given shortly beforehand. Dr Patsy Fuentes notified that pt states she will refuse hydralazine. Order received for metoprolol.

## 2018-03-10 NOTE — PROGRESS NOTES
Progress Note    Patient: Rich Curiel MRN: [id number]  SSN: [id number]    YOB: 1945  Age: 67 y.o. Sex: female      Admit Date: 3/7/2018    LOS: 3 days  POD#1 s/p lap cholecystectomy for cholelithiasis and gallstone pancreatitis    Subjective:     Feels well, ate breakfast fine, no abdominal pain (Exparel used at trochar sites)  Wants to go home. Objective:     Vitals:    03/09/18 2145 03/09/18 2357 03/10/18 0400 03/10/18 0800   BP: 144/71 158/81 151/71 191/83   Pulse: (!) 110 100 88 95   Resp: 18 18 18 18   Temp: 98.5 °F (36.9 °C) 97.8 °F (36.6 °C) 97.9 °F (36.6 °C) 97.6 °F (36.4 °C)   SpO2: 97% 95% 94% 94%   Weight:       Height:            Intake and Output:  Current Shift:    Last three shifts: 03/08 1901 - 03/10 0700  In: 5262.5 [P.O.:600; I.V.:4662.5]  Out: 4875 [Urine:4850]    Physical Exam:   Note BP quite elevated  Looks well, not jaundiced  Abdomen is soft and non tender (Exparel used) all sites clean closed and dry  Legs soft and non tender    Lab/Data Review:  Recent Results (from the past 12 hour(s))   METABOLIC PANEL, COMPREHENSIVE    Collection Time: 03/10/18  2:37 AM   Result Value Ref Range    Sodium 133 (L) 136 - 145 mmol/L    Potassium 3.5 3.5 - 5.5 mmol/L    Chloride 101 100 - 108 mmol/L    CO2 23 21 - 32 mmol/L    Anion gap 9 3.0 - 18 mmol/L    Glucose 126 (H) 74 - 99 mg/dL    BUN 10 7.0 - 18 MG/DL    Creatinine 0.74 0.6 - 1.3 MG/DL    BUN/Creatinine ratio 14 12 - 20      GFR est AA >60 >60 ml/min/1.73m2    GFR est non-AA >60 >60 ml/min/1.73m2    Calcium 8.9 8.5 - 10.1 MG/DL    Bilirubin, total 5.6 (H) 0.2 - 1.0 MG/DL    ALT (SGPT) 153 (H) 13 - 56 U/L    AST (SGOT) 47 (H) 15 - 37 U/L    Alk.  phosphatase 481 (H) 45 - 117 U/L    Protein, total 6.6 6.4 - 8.2 g/dL    Albumin 3.0 (L) 3.4 - 5.0 g/dL    Globulin 3.6 2.0 - 4.0 g/dL    A-G Ratio 0.8 0.8 - 1.7     CBC WITH AUTOMATED DIFF    Collection Time: 03/10/18  2:37 AM   Result Value Ref Range    WBC 25.6 (H) 4.6 - 13.2 K/uL    RBC 4.00 (L) 4.20 - 5.30 M/uL    HGB 11.7 (L) 12.0 - 16.0 g/dL    HCT 33.2 (L) 35.0 - 45.0 %    MCV 83.0 74.0 - 97.0 FL    MCH 29.3 24.0 - 34.0 PG    MCHC 35.2 31.0 - 37.0 g/dL    RDW 14.7 (H) 11.6 - 14.5 %    PLATELET 105 393 - 987 K/uL    MPV 10.1 9.2 - 11.8 FL    NEUTROPHILS 40 (L) 42 - 75 %    BAND NEUTROPHILS 1 0 - 5 %    LYMPHOCYTES 58 (H) 20 - 51 %    MONOCYTES 1 (L) 2 - 9 %    EOSINOPHILS 0 0 - 5 %    BASOPHILS 0 0 - 3 %    ABS. NEUTROPHILS 10.5 (H) 1.8 - 8.0 K/UL    ABS. LYMPHOCYTES 14.8 (H) 0.8 - 3.5 K/UL    ABS. MONOCYTES 0.3 0 - 1.0 K/UL    ABS. EOSINOPHILS 0.0 0.0 - 0.4 K/UL    ABS. BASOPHILS 0.0 0.0 - 0.06 K/UL    DF MANUAL      PLATELET COMMENTS ADEQUATE PLATELETS      RBC COMMENTS NORMOCYTIC, NORMOCHROMIC      WBC COMMENTS SMUDGE CELLS          Pathology is pending    Assessment:     Active Problems:    Cholangitis (3/7/2018)      Choledocholithiasis (3/7/2018)      Cholelithiases (3/7/2018)      S/P laparoscopic cholecystectomy (3/9/2018)      Overview: Done 3/9/2018    Total bilirubin and transaminase falling. Plan:   OK for discharge from surgical prospective. Ok to shower, no lifting > 15 lbs for two weeks,  No diet restrictions. Follow up made for 3/23/18 at 1030 in my office. I left a script for Norco 7.5/325 on the chart.     Signed By: Lieutenant Opal MD     March 10, 2018

## 2018-03-10 NOTE — PROGRESS NOTES
Progress Note    Patient: David Barrow MRN: [id number]  CSN: [id number]    YOB: 1945  Age: 67 y.o. Sex: female    DOA: 3/7/2018 LOS:  LOS: 3 days                    Subjective: David Barrow is a 67 y.o. female who has h/o abdominal pain and feeling feverish. Abdominal pain waxes and wanes. Associated with nausea with 1 episode of vomiting before admission . Pt with h/o GERD on PPI at home. Pt denies alcohol, tobacco or ilicit drug use. [place] elevated to [address]     Ct scan abdomen    Ct abdomen- Severe intrahepatic and extrahepatic biliary dilatation which is most likely due to choledocholithiasis at the level of the ampulla. There is also extensive cholelithiasis in the gallbladder which is moderately distended but without definite adjacent inflammatory change. Bp was running above 969 systolic last night given dose of hydralazine and she felt like she react to med and had tachycardia  pt started on Metoprolol 25 mg  BID. cardiac enzymes negative   Pt tolerating her diet she doesn't have h/o hypertension Bp still elevated will D/C IV fluid and use clonidine prn    Pt still on IV ABT will discuss with surgery if she can be switched to oral     Chief Complaint:   Chief Complaint   Patient presents with    Abdominal Pain       Review of systems  General: No fevers or chills. Cardiovascular: No chest pain or pressure. No palpitations. Pulmonary: No shortness of breath, cough or wheeze. Gastrointestinal: No abdominal pain, nausea, vomiting or diarrhea. Genitourinary: No urinary frequency, urgency, hesitancy or dysuria. Musculoskeletal: No joint or muscle pain, no back pain, no recent trauma. Neurologic: No headache, numbness, tingling or weakness.      Objective:     Physical Exam:  Visit Vitals    BP (!) 184/95 (BP 1 Location: Right arm, BP Patient Position: Supine)    Pulse 84    Temp 97.7 °F (36.5 °C)    Resp 19    Ht 5' 5\" (1.651 m)    Wt 68.4 kg (150 lb 14.4 oz)  SpO2 96%    Breastfeeding No    BMI 25.11 kg/m2        General:         Alert, cooperative, no acute distress    HEENT: NC, Atraumatic. PERRLA, anicteric sclerae. Lungs: CTA Bilaterally. No Wheezing/Rhonchi/Rales. Heart:  Regular  rhythm,  No murmur, No Rubs, No Gallops  Abdomen: Soft, Non distended, Non tender.  +Bowel sounds, no HSM  Extremities: no lower limb edema scar of surgery clean   Psych:   . Not anxious or agitated. Neurologic:  CN 2-12 grossly intact, Alert and oriented X 3. No acute neurological                                 Deficits,     Intake and Output:  Current Shift:     Last three shifts:  03/08 1901 - 03/10 0700  In: 5262.5 [P.O.:600;  I.V.:4662.5]  Out: 4875 [Urine:4850]    Labs: Results:       Chemistry Recent Labs      03/10/18   0237  03/09/18 2128 03/09/18   0301 03/08/18   0348   GLU  126*  174*  99  156*   NA  133*  133*  133*  130*   K  3.5  3.6  3.0*  3.2*   CL  101  98*  101  97*   CO2  23  25  23  23   BUN  10  8  8  12   CREA  0.74  0.78  0.64  0.74   CA  8.9  8.9  8.6  8.3*   AGAP  9  10  9  10   BUCR  14  10*  13  16   AP  481*   --   465*  517*   TP  6.6   --   5.9*  6.1*   ALB  3.0*   --   2.7*  2.8*   GLOB  3.6   --   3.2  3.3   AGRAT  0.8   --   0.8  0.8      CBC w/Diff Recent Labs      03/10/18   0237  03/08/18   0348   WBC  25.6*  27.0*   RBC  4.00*  3.95*   HGB  11.7*  11.4*   HCT  33.2*  33.3*   PLT  274  276   GRANS  40*  47   LYMPH  58*  50   EOS  0  0      Cardiac Enzymes Recent Labs      03/10/18   0958  03/09/18   2128   CPK  109  69   CKND1  3.4  4.1*      Coagulation Recent Labs      03/09/18   0301   PTP  13.9   INR  1.1       Lipid Panel Lab Results   Component Value Date/Time    Cholesterol, total 203 (H) 03/23/2010 09:07 AM    HDL Cholesterol 64 (H) 03/23/2010 09:07 AM    LDL, calculated 124.8 (H) 03/23/2010 09:07 AM    VLDL, calculated 14.2 03/23/2010 09:07 AM    Triglyceride 71 03/23/2010 09:07 AM    CHOL/HDL Ratio 3.2 03/23/2010 09:07 AM BNP No results for input(s): BNPP in the last 72 hours.    Liver Enzymes Recent Labs      03/10/18   0237   TP  6.6   ALB  3.0*   AP  481*   SGOT  47*      Thyroid Studies Lab Results   Component Value Date/Time    TSH 0.77 03/23/2010 09:07 AM          Procedures/imaging: see electronic medical records for all procedures/Xrays and details which were not copied into this note but were reviewed prior to creation of Plan    Medications:   Current Facility-Administered Medications   Medication Dose Route Frequency    metoprolol tartrate (LOPRESSOR) tablet 25 mg  25 mg Oral BID    famotidine (PEPCID) tablet 20 mg  20 mg Oral BID    cloNIDine HCl (CATAPRES) tablet 0.1 mg  0.1 mg Oral TID PRN    HYDROcodone-acetaminophen (NORCO) 7.5-325 mg per tablet 1-2 Tab  1-2 Tab Oral Q4H PRN    morphine injection 4 mg  4 mg IntraVENous Q2H PRN    docusate sodium (COLACE) capsule 100 mg  100 mg Oral BID PRN    diphenhydrAMINE (BENADRYL) injection 12.5 mg  12.5 mg IntraVENous Q4H PRN    cefepime (MAXIPIME) 2 g in sterile water (preservative free) 10 mL IV syringe  2 g IntraVENous Q8H    metroNIDAZOLE (FLAGYL) IVPB premix 500 mg  500 mg IntraVENous Q8H    ondansetron (ZOFRAN ODT) tablet 4 mg  4 mg Oral Q6H PRN       Assessment/Plan     Active Problems:    Cholangitis (3/7/2018)      Choledocholithiasis (3/7/2018)      Cholelithiases (3/7/2018)      S/P laparoscopic cholecystectomy (3/9/2018)      Overview: Done 3/9/2018      Plan     S/P cholecystectomy due to cholelithiasis / leucocytosis  Continue Iv ABT   Repeat CBC in AM   If WBC improved will switch to oral     Hypertension  continue Lopressor 25 mg BID  D/C IV fluid  Clonidine 0.1 mg TID prn SBP above 160    Cbc, cmp, mag in AM      Alexandru Randolph MD  3/10/2018 2:03 PM

## 2018-03-10 NOTE — ROUTINE PROCESS
Bedside and Verbal shift change report given to April. Report included the following information SBAR, Kardex, Intake/Output, MAR and Recent Results.

## 2018-03-11 ENCOUNTER — HOME HEALTH ADMISSION (OUTPATIENT)
Dept: HOME HEALTH SERVICES | Facility: HOME HEALTH | Age: 73
End: 2018-03-11
Payer: MEDICARE

## 2018-03-11 VITALS
BODY MASS INDEX: 25.14 KG/M2 | SYSTOLIC BLOOD PRESSURE: 173 MMHG | WEIGHT: 150.9 LBS | DIASTOLIC BLOOD PRESSURE: 76 MMHG | RESPIRATION RATE: 16 BRPM | HEIGHT: 65 IN | OXYGEN SATURATION: 96 % | TEMPERATURE: 97 F | HEART RATE: 74 BPM

## 2018-03-11 LAB
ALBUMIN SERPL-MCNC: 2.7 G/DL (ref 3.4–5)
ALBUMIN/GLOB SERPL: 0.9 {RATIO} (ref 0.8–1.7)
ALP SERPL-CCNC: 373 U/L (ref 45–117)
ALT SERPL-CCNC: 118 U/L (ref 13–56)
ANION GAP SERPL CALC-SCNC: 6 MMOL/L (ref 3–18)
AST SERPL-CCNC: 50 U/L (ref 15–37)
BASOPHILS # BLD: 0 K/UL (ref 0–0.06)
BASOPHILS NFR BLD: 0 % (ref 0–3)
BILIRUB SERPL-MCNC: 3.3 MG/DL (ref 0.2–1)
BUN SERPL-MCNC: 14 MG/DL (ref 7–18)
BUN/CREAT SERPL: 22 (ref 12–20)
CALCIUM SERPL-MCNC: 8.6 MG/DL (ref 8.5–10.1)
CHLORIDE SERPL-SCNC: 102 MMOL/L (ref 100–108)
CO2 SERPL-SCNC: 27 MMOL/L (ref 21–32)
CREAT SERPL-MCNC: 0.64 MG/DL (ref 0.6–1.3)
DIFFERENTIAL METHOD BLD: ABNORMAL
EOSINOPHIL # BLD: 0.4 K/UL (ref 0–0.4)
EOSINOPHIL NFR BLD: 2 % (ref 0–5)
ERYTHROCYTE [DISTWIDTH] IN BLOOD BY AUTOMATED COUNT: 14.9 % (ref 11.6–14.5)
GLOBULIN SER CALC-MCNC: 3.1 G/DL (ref 2–4)
GLUCOSE SERPL-MCNC: 96 MG/DL (ref 74–99)
HCT VFR BLD AUTO: 31.3 % (ref 35–45)
HGB BLD-MCNC: 10.9 G/DL (ref 12–16)
LYMPHOCYTES # BLD: 11.7 K/UL (ref 0.8–3.5)
LYMPHOCYTES NFR BLD: 53 % (ref 20–51)
MAGNESIUM SERPL-MCNC: 1.6 MG/DL (ref 1.6–2.6)
MCH RBC QN AUTO: 28.9 PG (ref 24–34)
MCHC RBC AUTO-ENTMCNC: 34.8 G/DL (ref 31–37)
MCV RBC AUTO: 83 FL (ref 74–97)
MONOCYTES # BLD: 0.9 K/UL (ref 0–1)
MONOCYTES NFR BLD: 4 % (ref 2–9)
NEUTS BAND NFR BLD MANUAL: 2 % (ref 0–5)
NEUTS SEG # BLD: 9.1 K/UL (ref 1.8–8)
NEUTS SEG NFR BLD: 39 % (ref 42–75)
PLATELET # BLD AUTO: 268 K/UL (ref 135–420)
PLATELET COMMENTS,PCOM: ABNORMAL
PMV BLD AUTO: 10.1 FL (ref 9.2–11.8)
POTASSIUM SERPL-SCNC: 3.2 MMOL/L (ref 3.5–5.5)
PROT SERPL-MCNC: 5.8 G/DL (ref 6.4–8.2)
RBC # BLD AUTO: 3.77 M/UL (ref 4.2–5.3)
RBC MORPH BLD: ABNORMAL
SODIUM SERPL-SCNC: 135 MMOL/L (ref 136–145)
WBC # BLD AUTO: 22.1 K/UL (ref 4.6–13.2)
WBC MORPH BLD: ABNORMAL

## 2018-03-11 PROCEDURE — 74011000250 HC RX REV CODE- 250: Performed by: EMERGENCY MEDICINE

## 2018-03-11 PROCEDURE — 74011250636 HC RX REV CODE- 250/636: Performed by: EMERGENCY MEDICINE

## 2018-03-11 PROCEDURE — 83735 ASSAY OF MAGNESIUM: CPT | Performed by: FAMILY MEDICINE

## 2018-03-11 PROCEDURE — 85025 COMPLETE CBC W/AUTO DIFF WBC: CPT | Performed by: FAMILY MEDICINE

## 2018-03-11 PROCEDURE — 80053 COMPREHEN METABOLIC PANEL: CPT | Performed by: FAMILY MEDICINE

## 2018-03-11 PROCEDURE — 36415 COLL VENOUS BLD VENIPUNCTURE: CPT | Performed by: FAMILY MEDICINE

## 2018-03-11 PROCEDURE — 74011250637 HC RX REV CODE- 250/637: Performed by: FAMILY MEDICINE

## 2018-03-11 PROCEDURE — 74011250637 HC RX REV CODE- 250/637: Performed by: SURGERY

## 2018-03-11 PROCEDURE — 74011250637 HC RX REV CODE- 250/637: Performed by: INTERNAL MEDICINE

## 2018-03-11 RX ORDER — DOCUSATE SODIUM 100 MG/1
100 CAPSULE, LIQUID FILLED ORAL
Qty: 30 CAP | Refills: 0 | Status: SHIPPED | OUTPATIENT
Start: 2018-03-11 | End: 2018-06-09

## 2018-03-11 RX ORDER — METOPROLOL TARTRATE 50 MG/1
50 TABLET ORAL EVERY 12 HOURS
Status: DISCONTINUED | OUTPATIENT
Start: 2018-03-11 | End: 2018-03-11 | Stop reason: HOSPADM

## 2018-03-11 RX ORDER — METOPROLOL TARTRATE 50 MG/1
50 TABLET ORAL EVERY 12 HOURS
Qty: 60 TAB | Refills: 0 | Status: SHIPPED | OUTPATIENT
Start: 2018-03-11 | End: 2022-10-13

## 2018-03-11 RX ORDER — CLONIDINE HYDROCHLORIDE 0.1 MG/1
0.1 TABLET ORAL
Qty: 30 TAB | Refills: 0 | Status: SHIPPED | OUTPATIENT
Start: 2018-03-11 | End: 2022-10-13

## 2018-03-11 RX ORDER — CLONIDINE HYDROCHLORIDE 0.1 MG/1
0.1 TABLET ORAL
Qty: 30 TAB | Refills: 0 | Status: SHIPPED | OUTPATIENT
Start: 2018-03-11 | End: 2018-03-11

## 2018-03-11 RX ORDER — ONDANSETRON 4 MG/1
4 TABLET, ORALLY DISINTEGRATING ORAL
Qty: 30 TAB | Refills: 0 | Status: SHIPPED | OUTPATIENT
Start: 2018-03-11 | End: 2022-10-13

## 2018-03-11 RX ORDER — FAMOTIDINE 20 MG/1
20 TABLET, FILM COATED ORAL 2 TIMES DAILY
Qty: 60 TAB | Refills: 0 | Status: SHIPPED | OUTPATIENT
Start: 2018-03-11 | End: 2022-10-13

## 2018-03-11 RX ORDER — METOPROLOL TARTRATE 50 MG/1
50 TABLET ORAL 2 TIMES DAILY
Status: DISCONTINUED | OUTPATIENT
Start: 2018-03-11 | End: 2018-03-11

## 2018-03-11 RX ORDER — POTASSIUM CHLORIDE 20 MEQ/1
40 TABLET, EXTENDED RELEASE ORAL
Status: COMPLETED | OUTPATIENT
Start: 2018-03-11 | End: 2018-03-11

## 2018-03-11 RX ORDER — POTASSIUM CHLORIDE 1.5 G/1.77G
40 POWDER, FOR SOLUTION ORAL
Status: DISCONTINUED | OUTPATIENT
Start: 2018-03-11 | End: 2018-03-11

## 2018-03-11 RX ADMIN — POTASSIUM CHLORIDE 40 MEQ: 20 TABLET, EXTENDED RELEASE ORAL at 12:13

## 2018-03-11 RX ADMIN — CLONIDINE HYDROCHLORIDE 0.1 MG: 0.1 TABLET ORAL at 10:32

## 2018-03-11 RX ADMIN — METOPROLOL TARTRATE 50 MG: 50 TABLET ORAL at 09:04

## 2018-03-11 RX ADMIN — METRONIDAZOLE 500 MG: 500 INJECTION, SOLUTION INTRAVENOUS at 09:03

## 2018-03-11 RX ADMIN — CLONIDINE HYDROCHLORIDE 0.1 MG: 0.1 TABLET ORAL at 00:12

## 2018-03-11 RX ADMIN — FAMOTIDINE 20 MG: 20 TABLET ORAL at 09:04

## 2018-03-11 RX ADMIN — METRONIDAZOLE 500 MG: 500 INJECTION, SOLUTION INTRAVENOUS at 01:57

## 2018-03-11 RX ADMIN — Medication 2 G: at 01:57

## 2018-03-11 RX ADMIN — Medication 2 G: at 09:03

## 2018-03-11 NOTE — DISCHARGE SUMMARY
Discharge Summary     Patient: Jose A Curry MRN: [id number]  SSN: [id number]    YOB: 1945  Age: 67 y.o. Sex: female       Admit Date: 3/7/2018    Discharge Date: 3/11/2018      Admission Diagnoses: Cholangitis  Choledocholithiasis  Cholelithiases  DX  dx    Discharge Diagnoses:   Problem List as of 3/11/2018  Date Reviewed: 3/9/2018          Codes Class Noted - Resolved    S/P laparoscopic cholecystectomy ICD-10-CM: Z90.49  ICD-9-CM: V45.89  3/9/2018 - Present    Overview Signed 3/9/2018  2:21 PM by Arjun Perry MD     Done 3/9/2018             Cholangitis ICD-10-CM: K83.0  ICD-9-CM: 576.1  3/7/2018 - Present        Choledocholithiasis ICD-10-CM: K80.50  ICD-9-CM: 574.50  3/7/2018 - Present        Cholelithiases ICD-10-CM: K80.20  ICD-9-CM: 574.20  3/7/2018 - Present               Discharge Condition: Good    Hospital Course: David Recio a 67 y. o. female who has h/o abdominal pain and feeling feverish.  Abdominal pain waxes and wanes. Associated with nausea with 1 episode of vomiting before admission . Pt with h/o GERD on PPI at home. Pt denies alcohol, tobacco or ilicit drug use. [place] elevated to [address]      Ct scan abdomen    Ct abdomen- Severe intrahepatic and extrahepatic biliary dilatation which is most likely due to choledocholithiasis at the level of the ampulla. There is also extensive cholelithiasis in the gallbladder which is moderately distended but without definite adjacent inflammatory change.     Bp was running above 022 systolic after surgery  given dose of hydralazine and pt  felt like she react to med and had tachycardia  pt started on Metoprolol 25 mg  BID. cardiac enzymes negative   Pt tolerating her diet she doesn't have h/o hypertension Bp still elevated  D/C IV fluid and use clonidine prn and Lopresor dose was increase to 50 mg BID. Bp before discharge 153/87, heart rate 83      Pt still on IV ABT .  discuss with surgery if she can be switched to oral Dr Juan Adhikari recommended close monitor for WBC as outpatient no need for oral ABT  Pt had cholecystectomy no complication WBC showed come down with resolving pancreatitis. norco script ws written by Dr Rachel Carpenter    Pt anxious to go home BP has been running high most likely chronic hypertension started on lopressor tolerating med no complications. Pt couldn't tolerate hydralazine took clonidine prn with good response no S.E. Pt had Bp machine at home she agree to f/u with Dr Pierre Smith in AM recheck blood work up and Bp . Will also get home health to monitor vitals and x at home discussed with nursing staff     Consults: surgery        Physical Examination:   General:     Alert, cooperative, no acute distress    HEENT:  NC, Atraumatic. PERRLA, anicteric sclerae. Lungs:  CTA Bilaterally. No Wheezing/Rhonchi/Rales. Heart:  Regular  rhythm,  No murmur, No Rubs, No Gallops  Abdomen:  Soft, Non distended, Non tender.  +Bowel sounds, no HSM  Extremities: no lower limb edema scar of surgery clean   Psych: Not anxious or agitated. Neurological: CN 2-12 grossly intact, Alert and oriented X 3. No acute neurological   Deficits,     Disposition: home with home health    Discharge Medications:   Current Discharge Medication List      START taking these medications    Details   docusate sodium (COLACE) 100 mg capsule Take 1 Cap by mouth two (2) times daily as needed for Constipation for up to 90 days. Qty: 30 Cap, Refills: 0      famotidine (PEPCID) 20 mg tablet Take 1 Tab by mouth two (2) times a day. Indications: Dyspepsia  Qty: 60 Tab, Refills: 0      metoprolol tartrate (LOPRESSOR) 50 mg tablet Take 1 Tab by mouth every twelve (12) hours. Qty: 60 Tab, Refills: 0      ondansetron (ZOFRAN ODT) 4 mg disintegrating tablet Take 1 Tab by mouth every six (6) hours as needed. Qty: 30 Tab, Refills: 0      cloNIDine HCl (CATAPRES) 0.1 mg tablet Take 1 Tab by mouth three (3) times daily as needed.  SBP above 160  Qty: 30 Tab, Refills: 0      HYDROcodone-acetaminophen (NORCO) 7.5-325 mg per tablet Take 1-2 Tabs by mouth every four (4) hours as needed. Max Daily Amount: 12 Tabs. Indications: Pain  Qty: 20 Tab, Refills: 0    Associated Diagnoses: S/P laparoscopic cholecystectomy             Activity: Activity as tolerated  Diet: Cardiac Diet  Wound Care: As directed    Follow-up Appointments   Procedures    FOLLOW UP VISIT Appointment in: Other (Specify) F/u Dr Wendie Vee tomorrow pt needs close monitor for WBC need to rpeat lab in AM cbc, CMP f/u surgery Dr Karla Weaver surgery ion 2 weeks . Pt needs close monitor for BP will get home health for pt and ot. .. F/u Dr Wendie Vee tomorrow pt needs close monitor for WBC need to rpeat lab in AM cbc, CMP f/u surgery Dr Karla Weaver surgery ion 2 weeks . Pt needs close monitor for BP will get home health for pt and ot medication management monitor vitasl     Standing Status:   Standing     Number of Occurrences:   1     Order Specific Question:   Appointment in     Answer:    Other (Specify)       Signed By: Arin Kamara MD     March 11, 2018

## 2018-03-11 NOTE — PROGRESS NOTES
Care Management Interventions  Mode of Transport at Discharge: Other (see comment) (Son is present to provide transportation home )  Transition of Care Consult (CM Consult): [address]: Yes  Discharge Durable Medical Equipment: No (ambulates with self)  Physical Therapy Consult: Yes  Occupational Therapy Consult: Yes  Current Support Network: Lives with Spouse Quentin Trevino spouse [phone])  Confirm Follow Up Transport: Family  Plan discussed with Pt/Family/Caregiver: Yes  Freedom of Choice Offered: Yes  Discharge Location  Discharge Placement: Home with home health       Discussed [facility] orders with pt. Pt initially declined [facility] stating that she does not require any services. Spoke with primary nurse who informed me HH mainly to monitor BP and educate on new antihypertensive. Spoke with pt and pt agrees to [facility]. Signed FOC in chart for [facility] and pt declined offer for copy. Referral submitted to [facility] and Hugh Kruger made aware.

## 2018-03-11 NOTE — PROGRESS NOTES
Progress Note    Patient: Collette Dinero MRN: [id number]  SSN: [id number]    YOB: 1945  Age: 67 y.o. Sex: female      Admit Date: 3/7/2018    LOS: 4 days  POD#4 s/p Lap cholecystectomy for gallstone pancreatitis/cholelithiasis    Subjective:   Dr. Nish Levine called me yesterday and I recommended stopping all antibiotics. I think her leukocytosis is related to pancreatitis, resolving, and not infectious (normal differential, no fevers, BC's no growth and gallbladder removed)      Today she is well eating fine and only mild right sided pain. BP medications have been increased/added Clonidine yesterday too. Objective:     Vitals:    03/11/18 0000 03/11/18 0012 03/11/18 0400 03/11/18 0800   BP: (!) 200/108 (!) 200/103 172/81 173/83   Pulse: 82 78 73 83   Resp: 18 18 17   Temp: 97.7 °F (36.5 °C)  97.8 °F (36.6 °C) 98.6 °F (37 °C)   SpO2: 94%  95% 96%   Weight:       Height:            Intake and Output:  Current Shift:    Last three shifts: 03/09 1901 - 03/11 0700  In: 1571.7 [P.O.:760; I.V.:811.7]  Out: 1300 [Urine:1300]    Physical Exam:   Looks well  RN starting new IV right hand  Abdomen is soft all sites clean dry and closed. Mild tenderness right side, no perotitoneal finding. Lab/Data Review:  Recent Results (from the past 12 hour(s))   CBC WITH AUTOMATED DIFF    Collection Time: 03/11/18  3:29 AM   Result Value Ref Range    WBC 22.1 (H) 4.6 - 13.2 K/uL    RBC 3.77 (L) 4.20 - 5.30 M/uL    HGB 10.9 (L) 12.0 - 16.0 g/dL    HCT 31.3 (L) 35.0 - 45.0 %    MCV 83.0 74.0 - 97.0 FL    MCH 28.9 24.0 - 34.0 PG    MCHC 34.8 31.0 - 37.0 g/dL    RDW 14.9 (H) 11.6 - 14.5 %    PLATELET 424 912 - 432 K/uL    MPV 10.1 9.2 - 11.8 FL    NEUTROPHILS 39 (L) 42 - 75 %    BAND NEUTROPHILS 2 0 - 5 %    LYMPHOCYTES 53 (H) 20 - 51 %    MONOCYTES 4 2 - 9 %    EOSINOPHILS 2 0 - 5 %    BASOPHILS 0 0 - 3 %    ABS. NEUTROPHILS 9.1 (H) 1.8 - 8.0 K/UL    ABS. LYMPHOCYTES 11.7 (H) 0.8 - 3.5 K/UL    ABS. MONOCYTES 0.9 0 - 1.0 K/UL    ABS. EOSINOPHILS 0.4 0.0 - 0.4 K/UL    ABS. BASOPHILS 0.0 0.0 - 0.06 K/UL    DF MANUAL      PLATELET COMMENTS ADEQUATE PLATELETS      RBC COMMENTS NORMOCYTIC, NORMOCHROMIC      WBC COMMENTS SMUDGE CELLS     MAGNESIUM    Collection Time: 03/11/18  3:29 AM   Result Value Ref Range    Magnesium 1.6 1.6 - 2.6 mg/dL   METABOLIC PANEL, COMPREHENSIVE    Collection Time: 03/11/18  3:29 AM   Result Value Ref Range    Sodium 135 (L) 136 - 145 mmol/L    Potassium 3.2 (L) 3.5 - 5.5 mmol/L    Chloride 102 100 - 108 mmol/L    CO2 27 21 - 32 mmol/L    Anion gap 6 3.0 - 18 mmol/L    Glucose 96 74 - 99 mg/dL    BUN 14 7.0 - 18 MG/DL    Creatinine 0.64 0.6 - 1.3 MG/DL    BUN/Creatinine ratio 22 (H) 12 - 20      GFR est AA >60 >60 ml/min/1.73m2    GFR est non-AA >60 >60 ml/min/1.73m2    Calcium 8.6 8.5 - 10.1 MG/DL    Bilirubin, total 3.3 (H) 0.2 - 1.0 MG/DL    ALT (SGPT) 118 (H) 13 - 56 U/L    AST (SGOT) 50 (H) 15 - 37 U/L    Alk. phosphatase 373 (H) 45 - 117 U/L    Protein, total 5.8 (L) 6.4 - 8.2 g/dL    Albumin 2.7 (L) 3.4 - 5.0 g/dL    Globulin 3.1 2.0 - 4.0 g/dL    A-G Ratio 0.9 0.8 - 1.7          Pathology is pening    Assessment:     Active Problems:    Cholangitis (3/7/2018)      Choledocholithiasis (3/7/2018)      Cholelithiases (3/7/2018)      S/P laparoscopic cholecystectomy (3/9/2018)      Overview: Done 3/9/2018    uncontrolled HTN  Leukocytosis related to resolving pancreatitis    Plan:     No new recommendations. Surgery is signing off.     Signed By: Lieutenant Opal MD     March 11, 2018

## 2018-03-11 NOTE — ROUTINE PROCESS
Bedside and Verbal shift change report given to April RN (oncoming nurse) by MARGARITA Wyatt RN (offgoing nurse). Report given with SBAR, Kardex, MAR and Recent Results.

## 2018-03-11 NOTE — PROGRESS NOTES
Problem: Falls - Risk of  Goal: *Absence of Falls  Document Trang Fall Risk and appropriate interventions in the flowsheet.    Outcome: Progressing Towards Goal  Fall Risk Interventions:  Mobility Interventions: Assess mobility with egress test, OT consult for ADLs, Patient to call before getting OOB, PT Consult for mobility concerns, PT Consult for assist device competence, Strengthening exercises (ROM-active/passive)         Medication Interventions: Teach patient to arise slowly    Elimination Interventions: Call light in reach, Elevated toilet seat, Patient to call for help with toileting needs, Toilet paper/wipes in reach, Toileting schedule/hourly rounds

## 2018-03-13 ENCOUNTER — HOME CARE VISIT (OUTPATIENT)
Dept: HOME HEALTH SERVICES | Facility: HOME HEALTH | Age: 73
End: 2018-03-13

## 2018-03-13 ENCOUNTER — HOME CARE VISIT (OUTPATIENT)
Dept: SCHEDULING | Facility: HOME HEALTH | Age: 73
End: 2018-03-13
Payer: MEDICARE

## 2018-03-13 ENCOUNTER — HOME CARE VISIT (OUTPATIENT)
Dept: SCHEDULING | Facility: HOME HEALTH | Age: 73
End: 2018-03-13

## 2018-03-13 LAB
BACTERIA SPEC CULT: NORMAL
SERVICE CMNT-IMP: NORMAL

## 2018-03-13 PROCEDURE — G0299 HHS/HOSPICE OF RN EA 15 MIN: HCPCS

## 2018-03-13 PROCEDURE — 3331090002 HH PPS REVENUE DEBIT

## 2018-03-13 PROCEDURE — 3331090003 HH PPS REVENUE ADJ

## 2018-03-13 PROCEDURE — 3331090001 HH PPS REVENUE CREDIT

## 2018-03-13 PROCEDURE — 400013 HH SOC

## 2018-03-14 ENCOUNTER — HOME CARE VISIT (OUTPATIENT)
Dept: HOME HEALTH SERVICES | Facility: HOME HEALTH | Age: 73
End: 2018-03-14
Payer: MEDICARE

## 2018-03-20 VITALS
OXYGEN SATURATION: 97 % | DIASTOLIC BLOOD PRESSURE: 80 MMHG | RESPIRATION RATE: 18 BRPM | SYSTOLIC BLOOD PRESSURE: 148 MMHG | HEART RATE: 99 BPM | TEMPERATURE: 97.9 F

## 2021-09-25 ENCOUNTER — HOSPITAL ENCOUNTER (EMERGENCY)
Age: 76
Discharge: HOME | End: 2021-09-25
Attending: STUDENT IN AN ORGANIZED HEALTH CARE EDUCATION/TRAINING PROGRAM
Payer: MEDICARE

## 2021-09-25 VITALS
HEIGHT: 62 IN | OXYGEN SATURATION: 100 % | HEART RATE: 93 BPM | DIASTOLIC BLOOD PRESSURE: 97 MMHG | WEIGHT: 143 LBS | BODY MASS INDEX: 26.31 KG/M2 | RESPIRATION RATE: 18 BRPM | TEMPERATURE: 98.3 F | SYSTOLIC BLOOD PRESSURE: 173 MMHG

## 2021-09-25 DIAGNOSIS — K08.89 PAIN, DENTAL: Primary | ICD-10-CM

## 2021-09-25 PROCEDURE — 99282 EMERGENCY DEPT VISIT SF MDM: CPT

## 2021-09-25 RX ORDER — AMOXICILLIN AND CLAVULANATE POTASSIUM 500; 125 MG/1; MG/1
1 TABLET, FILM COATED ORAL 2 TIMES DAILY
Qty: 14 TABLET | Refills: 0 | Status: SHIPPED | OUTPATIENT
Start: 2021-09-25 | End: 2021-10-02

## 2021-09-25 NOTE — DISCHARGE INSTRUCTIONS
Please take the entire course of Augmentin antibiotics. It is safe for you to take Tylenol, ibuprofen or Aleve to help manage her pain until you see your dentist on Monday. It is also safe for you to take the antibiotics and receive your booster coverage    Return to emergency department if you develop high fevers, worsening swelling or pain, difficulty swallowing or other concerning symptoms.

## 2021-09-25 NOTE — ED PROVIDER NOTES
EMERGENCY DEPARTMENT HISTORY AND PHYSICAL EXAM    7:32 AM    Date: 9/25/2021  Patient Name: Kenisha Andrews    History of Presenting Illness     Chief Complaint   Patient presents with    Dental Pain       History Provided By: Patient  Location/Duration/Severity/Modifying factors   HPI  Kenisha Andrews is a 67 y.o. female in the emergency department this morning for dental pain. She says that a while ago she had a dental filling fall out from one of her right upper molars, and since then has been having some intermittent problems with it. Last night she had pain in this right upper molar tooth that improved with an Aleve tablet however she is here this morning because she would like to be prescribed an antibiotic until she can see her dentist on Monday. She denies any fevers, difficulty swallowing, changes in voice, sore throat or other medical issues. Pain is throbbing, 2-10 in severity. She also endorses mild right-sided facial swelling. No other medical complaints. PCP: Andrea Fernandez MD    Current Outpatient Medications   Medication Sig Dispense Refill    amoxicillin-clavulanate (Augmentin) 500-125 mg per tablet Take 1 Tablet by mouth two (2) times a day for 7 days. 14 Tablet 0    famotidine (PEPCID) 20 mg tablet Take 1 Tab by mouth two (2) times a day. Indications: Dyspepsia 60 Tab 0    metoprolol tartrate (LOPRESSOR) 50 mg tablet Take 1 Tab by mouth every twelve (12) hours. 60 Tab 0    ondansetron (ZOFRAN ODT) 4 mg disintegrating tablet Take 1 Tab by mouth every six (6) hours as needed. (Patient taking differently: Take 1 Tab by mouth every six (6) hours as needed for Nausea.) 30 Tab 0    cloNIDine HCl (CATAPRES) 0.1 mg tablet Take 1 Tab by mouth three (3) times daily as needed. SBP above 160 30 Tab 0    HYDROcodone-acetaminophen (NORCO) 7.5-325 mg per tablet Take 1-2 Tabs by mouth every four (4) hours as needed. Max Daily Amount: 12 Tabs.  Indications: Pain 20 Tab 0       Past History     Past Medical History:  Past Medical History:   Diagnosis Date    Chronic GERD        Past Surgical History:  No past surgical history on file. Family History:  No family history on file. Social History:  Social History     Tobacco Use    Smoking status: Not on file   Substance Use Topics    Alcohol use: Not on file    Drug use: Not on file       Allergies:  No Known Allergies    I reviewed and confirmed the above information with patient and updated as necessary. Review of Systems     Review of Systems   Constitutional: Negative for diaphoresis and fever. HENT: Positive for dental problem and facial swelling. Negative for ear pain and sore throat. Eyes:        No acute change in vision   Respiratory: Negative for cough and shortness of breath. Cardiovascular: Negative for chest pain and leg swelling. Gastrointestinal: Negative for abdominal pain and vomiting. Genitourinary: Negative for dysuria. Musculoskeletal: Negative for neck pain. Skin: Negative for wound. Neurological: Negative for weakness and headaches. Physical Exam     Visit Vitals  BP (!) 173/97   Pulse 93   Temp 98.3 °F (36.8 °C)   Resp 18   Ht 5' 2\" (1.575 m)   Wt 64.9 kg (143 lb)   SpO2 100%   BMI 26.16 kg/m²       Physical Exam  Vitals and nursing note reviewed. Constitutional:       Comments: Pleasant adult female sitting comfortably in the hospital stretcher   HENT:      Head:      Comments: Mild right-sided facial swelling without erythema     Mouth/Throat:      Mouth: Mucous membranes are moist.        Comments: Small area of tenderness lateral to the right most posterior molar without fluctuance or induration. No drainable lesions. Appears to be a failed dental filling in this area. Mild percussion tenderness in his teeth. Floor of the mouth is soft  Eyes:      Pupils: Pupils are equal, round, and reactive to light. Cardiovascular:      Rate and Rhythm: Normal rate and regular rhythm.       Pulses: Normal pulses. Pulmonary:      Effort: Pulmonary effort is normal.      Breath sounds: Normal breath sounds. Abdominal:      Palpations: Abdomen is soft. Tenderness: There is no abdominal tenderness. Musculoskeletal:         General: No signs of injury. Normal range of motion. Cervical back: Normal range of motion. Skin:     General: Skin is warm. Neurological:      General: No focal deficit present. Mental Status: She is alert and oriented to person, place, and time. Mental status is at baseline. Diagnostic Study Results     Labs -  No results found for this or any previous visit (from the past 24 hour(s)). Radiologic Studies -   No orders to display           Medical Decision Making   I am the first provider for this patient. I reviewed the vital signs, available nursing notes, past medical history, past surgical history, family history and social history. Vital Signs-Reviewed the patient's vital signs. Records Reviewed: Nursing Notes (Time of Review: 7:32 AM)    Provider Notes (Medical Decision Making):   MDM  Very pleasant 48-year-old female here with dental pain for the last 12 hours, suspect likely due to dental caries, pulpitis. No evidence of drainable abscess or lesion. No clinical evidence of Reji's angina or other sinister problem    ED Course: Progress Notes, Reevaluation, and Consults:  Patient is afebrile and hemodynamically normal  Sitting comfortably, not in distress  Very mild facial swelling on the right  Exam is reassuring, no drainable lesions    Discussed care plan with the patient, will send with a prescription for Augmentin for 7 days. She will see her dentist on Monday. She prefers to use Motrin and Tylenol to manage her symptoms and pain until then. We discussed signs and symptoms that would prompt return to the ED. She understands this treatment plan and agrees and was discharged home in stable condition.              Procedures      Diagnosis Clinical Impression:   1. Pain, dental        Disposition: Home    Follow-up Information    None          Patient's Medications   Start Taking    AMOXICILLIN-CLAVULANATE (AUGMENTIN) 500-125 MG PER TABLET    Take 1 Tablet by mouth two (2) times a day for 7 days. Continue Taking    CLONIDINE HCL (CATAPRES) 0.1 MG TABLET    Take 1 Tab by mouth three (3) times daily as needed. SBP above 160    FAMOTIDINE (PEPCID) 20 MG TABLET    Take 1 Tab by mouth two (2) times a day. Indications: Dyspepsia    HYDROCODONE-ACETAMINOPHEN (NORCO) 7.5-325 MG PER TABLET    Take 1-2 Tabs by mouth every four (4) hours as needed. Max Daily Amount: 12 Tabs. Indications: Pain    METOPROLOL TARTRATE (LOPRESSOR) 50 MG TABLET    Take 1 Tab by mouth every twelve (12) hours. ONDANSETRON (ZOFRAN ODT) 4 MG DISINTEGRATING TABLET    Take 1 Tab by mouth every six (6) hours as needed. These Medications have changed    No medications on file   Stop Taking    No medications on file       Kaitlin Weston MD   Emergency Medicine   September 25, 2021, 7:32 AM     This note is dictated utilizing Dragon voice recognition software. Unfortunately this leads to occasional typographical errors using the voice recognition. I apologize in advance if the situation occurs. If questions occur please do not hesitate to contact me directly.     Jerzy Rider MD

## 2021-09-25 NOTE — ED TRIAGE NOTES
Pt reports right sided upper  dental pain since last night. Pt states she thinks she has an abscess.

## 2022-10-11 ENCOUNTER — HOSPITAL ENCOUNTER (INPATIENT)
Age: 77
LOS: 2 days | Discharge: HOME HEALTH | DRG: 522 | End: 2022-10-13
Attending: EMERGENCY MEDICINE | Admitting: INTERNAL MEDICINE
Payer: MEDICARE

## 2022-10-11 ENCOUNTER — ANESTHESIA EVENT (OUTPATIENT)
Dept: SURGERY | Age: 77
DRG: 522 | End: 2022-10-11
Payer: MEDICARE

## 2022-10-11 ENCOUNTER — ANESTHESIA (OUTPATIENT)
Dept: SURGERY | Age: 77
DRG: 522 | End: 2022-10-11
Payer: MEDICARE

## 2022-10-11 ENCOUNTER — APPOINTMENT (OUTPATIENT)
Dept: GENERAL RADIOLOGY | Age: 77
DRG: 522 | End: 2022-10-11
Attending: EMERGENCY MEDICINE
Payer: MEDICARE

## 2022-10-11 DIAGNOSIS — S72.001A DISPLACED FRACTURE OF RIGHT FEMORAL NECK (HCC): ICD-10-CM

## 2022-10-11 DIAGNOSIS — R33.9 URINE RETENTION: ICD-10-CM

## 2022-10-11 DIAGNOSIS — M16.0 PRIMARY OSTEOARTHRITIS OF BOTH HIPS: Chronic | ICD-10-CM

## 2022-10-11 DIAGNOSIS — S72.001A CLOSED FRACTURE OF RIGHT HIP, INITIAL ENCOUNTER (HCC): Primary | ICD-10-CM

## 2022-10-11 PROBLEM — K21.9 GERD (GASTROESOPHAGEAL REFLUX DISEASE): Chronic | Status: ACTIVE | Noted: 2022-10-11

## 2022-10-11 LAB
ABO + RH BLD: NORMAL
ALBUMIN SERPL-MCNC: 4 G/DL (ref 3.4–5)
ALBUMIN/GLOB SERPL: 1.4 {RATIO} (ref 0.8–1.7)
ALP SERPL-CCNC: 90 U/L (ref 45–117)
ALT SERPL-CCNC: 20 U/L (ref 13–56)
ANION GAP SERPL CALC-SCNC: 11 MMOL/L (ref 3–18)
APPEARANCE UR: CLEAR
APTT PPP: 25.8 SEC (ref 23–36.4)
AST SERPL-CCNC: 24 U/L (ref 10–38)
BACTERIA URNS QL MICRO: NEGATIVE /HPF
BASOPHILS # BLD: 0 K/UL (ref 0–0.1)
BASOPHILS NFR BLD: 0 % (ref 0–2)
BILIRUB SERPL-MCNC: 1.5 MG/DL (ref 0.2–1)
BILIRUB UR QL: NEGATIVE
BLOOD GROUP ANTIBODIES SERPL: NORMAL
BUN SERPL-MCNC: 10 MG/DL (ref 7–18)
BUN/CREAT SERPL: 16 (ref 12–20)
CALCIUM SERPL-MCNC: 8.6 MG/DL (ref 8.5–10.1)
CHLORIDE SERPL-SCNC: 95 MMOL/L (ref 100–111)
CO2 SERPL-SCNC: 25 MMOL/L (ref 21–32)
COLOR UR: YELLOW
COVID-19 RAPID TEST, COVR: NOT DETECTED
CREAT SERPL-MCNC: 0.61 MG/DL (ref 0.6–1.3)
DIFFERENTIAL METHOD BLD: ABNORMAL
EOSINOPHIL # BLD: 0 K/UL (ref 0–0.4)
EOSINOPHIL NFR BLD: 0 % (ref 0–5)
EPITH CASTS URNS QL MICRO: NEGATIVE /LPF (ref 0–5)
ERYTHROCYTE [DISTWIDTH] IN BLOOD BY AUTOMATED COUNT: 12.9 % (ref 11.6–14.5)
EST. AVERAGE GLUCOSE BLD GHB EST-MCNC: 105 MG/DL
GLOBULIN SER CALC-MCNC: 2.8 G/DL (ref 2–4)
GLUCOSE SERPL-MCNC: 118 MG/DL (ref 74–99)
GLUCOSE UR STRIP.AUTO-MCNC: NEGATIVE MG/DL
HBA1C MFR BLD: 5.3 % (ref 4.2–5.6)
HCT VFR BLD AUTO: 37.3 % (ref 35–45)
HGB BLD-MCNC: 12.7 G/DL (ref 12–16)
HGB UR QL STRIP: NEGATIVE
IMM GRANULOCYTES # BLD AUTO: 0 K/UL (ref 0–0.04)
IMM GRANULOCYTES NFR BLD AUTO: 0 % (ref 0–0.5)
INR PPP: 0.9 (ref 0.8–1.2)
KETONES UR QL STRIP.AUTO: ABNORMAL MG/DL
LEUKOCYTE ESTERASE UR QL STRIP.AUTO: NEGATIVE
LYMPHOCYTES # BLD: 19.5 K/UL (ref 0.9–3.6)
LYMPHOCYTES NFR BLD: 62 % (ref 21–52)
MCH RBC QN AUTO: 28.7 PG (ref 24–34)
MCHC RBC AUTO-ENTMCNC: 34 G/DL (ref 31–37)
MCV RBC AUTO: 84.4 FL (ref 78–100)
MONOCYTES # BLD: 0.3 K/UL (ref 0.05–1.2)
MONOCYTES NFR BLD: 1 % (ref 3–10)
NEUTS SEG # BLD: 11.6 K/UL (ref 1.8–8)
NEUTS SEG NFR BLD: 37 % (ref 40–73)
NITRITE UR QL STRIP.AUTO: NEGATIVE
NRBC # BLD: 0 K/UL (ref 0–0.01)
NRBC BLD-RTO: 0 PER 100 WBC
PERIPHERAL SMEAR,PSM: NORMAL
PH UR STRIP: 7.5 [PH] (ref 5–8)
PLATELET # BLD AUTO: 250 K/UL (ref 135–420)
PLATELET COMMENTS,PCOM: ABNORMAL
PMV BLD AUTO: 10.1 FL (ref 9.2–11.8)
POTASSIUM SERPL-SCNC: 3.4 MMOL/L (ref 3.5–5.5)
PROT SERPL-MCNC: 6.8 G/DL (ref 6.4–8.2)
PROT UR STRIP-MCNC: 30 MG/DL
PROTHROMBIN TIME: 12.8 SEC (ref 11.5–15.2)
RBC # BLD AUTO: 4.42 M/UL (ref 4.2–5.3)
RBC #/AREA URNS HPF: NEGATIVE /HPF (ref 0–5)
RBC MORPH BLD: ABNORMAL
SODIUM SERPL-SCNC: 131 MMOL/L (ref 136–145)
SOURCE, COVRS: NORMAL
SP GR UR REFRACTOMETRY: 1.01 (ref 1–1.03)
SPECIMEN EXP DATE BLD: NORMAL
UROBILINOGEN UR QL STRIP.AUTO: 1 EU/DL (ref 0.2–1)
WBC # BLD AUTO: 31.4 K/UL (ref 4.6–13.2)
WBC URNS QL MICRO: NEGATIVE /HPF (ref 0–4)

## 2022-10-11 PROCEDURE — 85730 THROMBOPLASTIN TIME PARTIAL: CPT

## 2022-10-11 PROCEDURE — 2709999900 HC NON-CHARGEABLE SUPPLY

## 2022-10-11 PROCEDURE — 77030008683 HC TU ET CUF COVD -A: Performed by: ANESTHESIOLOGY

## 2022-10-11 PROCEDURE — 77030006835 HC BLD SAW SAG STRY -B: Performed by: SPECIALIST

## 2022-10-11 PROCEDURE — C1776 JOINT DEVICE (IMPLANTABLE): HCPCS | Performed by: SPECIALIST

## 2022-10-11 PROCEDURE — 2709999900 HC NON-CHARGEABLE SUPPLY: Performed by: SPECIALIST

## 2022-10-11 PROCEDURE — 27130 TOTAL HIP ARTHROPLASTY: CPT | Performed by: SPECIALIST

## 2022-10-11 PROCEDURE — 76942 ECHO GUIDE FOR BIOPSY: CPT | Performed by: ANESTHESIOLOGY

## 2022-10-11 PROCEDURE — 77030018673: Performed by: SPECIALIST

## 2022-10-11 PROCEDURE — 74011250636 HC RX REV CODE- 250/636: Performed by: NURSE ANESTHETIST, CERTIFIED REGISTERED

## 2022-10-11 PROCEDURE — 01214 ANES OPEN PX TOT HIP ARTHRP: CPT | Performed by: NURSE ANESTHETIST, CERTIFIED REGISTERED

## 2022-10-11 PROCEDURE — 0SR903Z REPLACEMENT OF RIGHT HIP JOINT WITH CERAMIC SYNTHETIC SUBSTITUTE, OPEN APPROACH: ICD-10-PCS | Performed by: SPECIALIST

## 2022-10-11 PROCEDURE — 86900 BLOOD TYPING SEROLOGIC ABO: CPT

## 2022-10-11 PROCEDURE — 77030031139 HC SUT VCRL2 J&J -A: Performed by: SPECIALIST

## 2022-10-11 PROCEDURE — 85610 PROTHROMBIN TIME: CPT

## 2022-10-11 PROCEDURE — 64450 NJX AA&/STRD OTHER PN/BRANCH: CPT | Performed by: ANESTHESIOLOGY

## 2022-10-11 PROCEDURE — 01214 ANES OPEN PX TOT HIP ARTHRP: CPT | Performed by: ANESTHESIOLOGY

## 2022-10-11 PROCEDURE — 99222 1ST HOSP IP/OBS MODERATE 55: CPT | Performed by: INTERNAL MEDICINE

## 2022-10-11 PROCEDURE — 76210000000 HC OR PH I REC 2 TO 2.5 HR: Performed by: SPECIALIST

## 2022-10-11 PROCEDURE — 74011000250 HC RX REV CODE- 250: Performed by: INTERNAL MEDICINE

## 2022-10-11 PROCEDURE — 74011250637 HC RX REV CODE- 250/637: Performed by: INTERNAL MEDICINE

## 2022-10-11 PROCEDURE — 99100 ANES PT EXTEME AGE<1 YR&>70: CPT | Performed by: ANESTHESIOLOGY

## 2022-10-11 PROCEDURE — 77030040361 HC SLV COMPR DVT MDII -B: Performed by: SPECIALIST

## 2022-10-11 PROCEDURE — 96374 THER/PROPH/DIAG INJ IV PUSH: CPT

## 2022-10-11 PROCEDURE — 74011000250 HC RX REV CODE- 250: Performed by: NURSE ANESTHETIST, CERTIFIED REGISTERED

## 2022-10-11 PROCEDURE — 87635 SARS-COV-2 COVID-19 AMP PRB: CPT

## 2022-10-11 PROCEDURE — 77030026438 HC STYL ET INTUB CARD -A: Performed by: ANESTHESIOLOGY

## 2022-10-11 PROCEDURE — 99285 EMERGENCY DEPT VISIT HI MDM: CPT

## 2022-10-11 PROCEDURE — 74011000250 HC RX REV CODE- 250: Performed by: ANESTHESIOLOGY

## 2022-10-11 PROCEDURE — 77030013708 HC HNDPC SUC IRR PULS STRY –B: Performed by: SPECIALIST

## 2022-10-11 PROCEDURE — 74011000250 HC RX REV CODE- 250: Performed by: SPECIALIST

## 2022-10-11 PROCEDURE — 77030038269 HC DRN EXT URIN PURWCK BARD -A

## 2022-10-11 PROCEDURE — 77030040830 HC CATH URETH FOL MDII -A: Performed by: SPECIALIST

## 2022-10-11 PROCEDURE — 81001 URINALYSIS AUTO W/SCOPE: CPT

## 2022-10-11 PROCEDURE — 77030008462 HC STPLR SKN PROX J&J -A: Performed by: SPECIALIST

## 2022-10-11 PROCEDURE — 77030040922 HC BLNKT HYPOTHRM STRY -A: Performed by: SPECIALIST

## 2022-10-11 PROCEDURE — 83036 HEMOGLOBIN GLYCOSYLATED A1C: CPT

## 2022-10-11 PROCEDURE — 74011000258 HC RX REV CODE- 258: Performed by: SPECIALIST

## 2022-10-11 PROCEDURE — 99100 ANES PT EXTEME AGE<1 YR&>70: CPT | Performed by: NURSE ANESTHETIST, CERTIFIED REGISTERED

## 2022-10-11 PROCEDURE — 65270000029 HC RM PRIVATE

## 2022-10-11 PROCEDURE — 51702 INSERT TEMP BLADDER CATH: CPT

## 2022-10-11 PROCEDURE — 76060000035 HC ANESTHESIA 2 TO 2.5 HR: Performed by: SPECIALIST

## 2022-10-11 PROCEDURE — 74011250636 HC RX REV CODE- 250/636: Performed by: ANESTHESIOLOGY

## 2022-10-11 PROCEDURE — 74011250637 HC RX REV CODE- 250/637: Performed by: SPECIALIST

## 2022-10-11 PROCEDURE — 76010000171 HC OR TIME 2 TO 2.5 HR INTENSV-TIER 1: Performed by: SPECIALIST

## 2022-10-11 PROCEDURE — 77030018836 HC SOL IRR NACL ICUM -A: Performed by: SPECIALIST

## 2022-10-11 PROCEDURE — 74011000258 HC RX REV CODE- 258: Performed by: NURSE ANESTHETIST, CERTIFIED REGISTERED

## 2022-10-11 PROCEDURE — 85025 COMPLETE CBC W/AUTO DIFF WBC: CPT

## 2022-10-11 PROCEDURE — 73501 X-RAY EXAM HIP UNI 1 VIEW: CPT

## 2022-10-11 PROCEDURE — 71045 X-RAY EXAM CHEST 1 VIEW: CPT

## 2022-10-11 PROCEDURE — 74011250636 HC RX REV CODE- 250/636: Performed by: INTERNAL MEDICINE

## 2022-10-11 PROCEDURE — 77030013079 HC BLNKT BAIR HGGR 3M -A: Performed by: ANESTHESIOLOGY

## 2022-10-11 PROCEDURE — 77030003029 HC SUT VCRL J&J -B: Performed by: SPECIALIST

## 2022-10-11 PROCEDURE — 74011250636 HC RX REV CODE- 250/636: Performed by: SPECIALIST

## 2022-10-11 PROCEDURE — 80053 COMPREHEN METABOLIC PANEL: CPT

## 2022-10-11 PROCEDURE — 77030014368: Performed by: SPECIALIST

## 2022-10-11 PROCEDURE — 99223 1ST HOSP IP/OBS HIGH 75: CPT | Performed by: SPECIALIST

## 2022-10-11 PROCEDURE — 74011250636 HC RX REV CODE- 250/636: Performed by: EMERGENCY MEDICINE

## 2022-10-11 DEVICE — MPACT SHELL SCREW PLUG
Type: IMPLANTABLE DEVICE | Site: HIP | Status: FUNCTIONAL
Brand: MPACT ACETABULAR SYSTEM

## 2022-10-11 DEVICE — CANCELLOUS BONE SCREW FLAT HEAD Ø 6,5 L15
Type: IMPLANTABLE DEVICE | Site: HIP | Status: FUNCTIONAL
Brand: MPACT ACETABULAR SYSTEM

## 2022-10-11 DEVICE — IMPLANTABLE DEVICE: Type: IMPLANTABLE DEVICE | Site: HIP | Status: FUNCTIONAL

## 2022-10-11 DEVICE — ACETABULAR SHELL Ø52 TWO HOLES
Type: IMPLANTABLE DEVICE | Site: HIP | Status: FUNCTIONAL
Brand: MPACT ACETABULAR SYSTEM

## 2022-10-11 DEVICE — COMPONENT TOT HIP CAPPED STD HD LNR COCR POLYETH: Type: IMPLANTABLE DEVICE | Site: HIP | Status: FUNCTIONAL

## 2022-10-11 DEVICE — FLAT PE  HC LINER Ø 36 / E
Type: IMPLANTABLE DEVICE | Site: HIP | Status: FUNCTIONAL
Brand: MPACT ACETABULAR SYSTEM

## 2022-10-11 DEVICE — CEMENTLESS TI COATED LAT PLUS STEM # 4
Type: IMPLANTABLE DEVICE | Site: HIP | Status: FUNCTIONAL
Brand: MASTERLOC LATPLUS

## 2022-10-11 RX ORDER — SODIUM CHLORIDE 0.9 % (FLUSH) 0.9 %
5-40 SYRINGE (ML) INJECTION EVERY 8 HOURS
Status: DISCONTINUED | OUTPATIENT
Start: 2022-10-11 | End: 2022-10-11 | Stop reason: SDUPTHER

## 2022-10-11 RX ORDER — OXYCODONE HYDROCHLORIDE 5 MG/1
5 TABLET ORAL
Status: DISCONTINUED | OUTPATIENT
Start: 2022-10-11 | End: 2022-10-13 | Stop reason: HOSPADM

## 2022-10-11 RX ORDER — FENTANYL CITRATE 50 UG/ML
INJECTION, SOLUTION INTRAMUSCULAR; INTRAVENOUS AS NEEDED
Status: DISCONTINUED | OUTPATIENT
Start: 2022-10-11 | End: 2022-10-11 | Stop reason: HOSPADM

## 2022-10-11 RX ORDER — GLYCOPYRROLATE 0.2 MG/ML
INJECTION INTRAMUSCULAR; INTRAVENOUS AS NEEDED
Status: DISCONTINUED | OUTPATIENT
Start: 2022-10-11 | End: 2022-10-11 | Stop reason: HOSPADM

## 2022-10-11 RX ORDER — NALOXONE HYDROCHLORIDE 0.4 MG/ML
0.4 INJECTION, SOLUTION INTRAMUSCULAR; INTRAVENOUS; SUBCUTANEOUS AS NEEDED
Status: DISCONTINUED | OUTPATIENT
Start: 2022-10-11 | End: 2022-10-13 | Stop reason: HOSPADM

## 2022-10-11 RX ORDER — ONDANSETRON 2 MG/ML
4 INJECTION INTRAMUSCULAR; INTRAVENOUS
Status: DISCONTINUED | OUTPATIENT
Start: 2022-10-11 | End: 2022-10-13 | Stop reason: HOSPADM

## 2022-10-11 RX ORDER — ONDANSETRON 2 MG/ML
4 INJECTION INTRAMUSCULAR; INTRAVENOUS
Status: DISCONTINUED | OUTPATIENT
Start: 2022-10-11 | End: 2022-10-11 | Stop reason: HOSPADM

## 2022-10-11 RX ORDER — SODIUM CHLORIDE 0.9 % (FLUSH) 0.9 %
5-40 SYRINGE (ML) INJECTION AS NEEDED
Status: DISCONTINUED | OUTPATIENT
Start: 2022-10-11 | End: 2022-10-11

## 2022-10-11 RX ORDER — CELECOXIB 100 MG/1
200 CAPSULE ORAL DAILY
Status: COMPLETED | OUTPATIENT
Start: 2022-10-11 | End: 2022-10-11

## 2022-10-11 RX ORDER — ONDANSETRON 2 MG/ML
INJECTION INTRAMUSCULAR; INTRAVENOUS AS NEEDED
Status: DISCONTINUED | OUTPATIENT
Start: 2022-10-11 | End: 2022-10-11 | Stop reason: HOSPADM

## 2022-10-11 RX ORDER — MORPHINE SULFATE 2 MG/ML
2 INJECTION, SOLUTION INTRAMUSCULAR; INTRAVENOUS
Status: COMPLETED | OUTPATIENT
Start: 2022-10-11 | End: 2022-10-11

## 2022-10-11 RX ORDER — SODIUM CHLORIDE, SODIUM LACTATE, POTASSIUM CHLORIDE, CALCIUM CHLORIDE 600; 310; 30; 20 MG/100ML; MG/100ML; MG/100ML; MG/100ML
75 INJECTION, SOLUTION INTRAVENOUS CONTINUOUS
Status: DISCONTINUED | OUTPATIENT
Start: 2022-10-11 | End: 2022-10-11 | Stop reason: HOSPADM

## 2022-10-11 RX ORDER — EPHEDRINE SULFATE/0.9% NACL/PF 25 MG/5 ML
SYRINGE (ML) INTRAVENOUS AS NEEDED
Status: DISCONTINUED | OUTPATIENT
Start: 2022-10-11 | End: 2022-10-11 | Stop reason: HOSPADM

## 2022-10-11 RX ORDER — FAMOTIDINE 20 MG/1
20 TABLET, FILM COATED ORAL 2 TIMES DAILY
Status: DISCONTINUED | OUTPATIENT
Start: 2022-10-11 | End: 2022-10-13 | Stop reason: HOSPADM

## 2022-10-11 RX ORDER — NEOSTIGMINE METHYLSULFATE 1 MG/ML
INJECTION, SOLUTION INTRAVENOUS AS NEEDED
Status: DISCONTINUED | OUTPATIENT
Start: 2022-10-11 | End: 2022-10-11 | Stop reason: HOSPADM

## 2022-10-11 RX ORDER — ACETAMINOPHEN 325 MG/1
650 TABLET ORAL EVERY 6 HOURS
Status: DISCONTINUED | OUTPATIENT
Start: 2022-10-12 | End: 2022-10-13 | Stop reason: HOSPADM

## 2022-10-11 RX ORDER — LIDOCAINE HYDROCHLORIDE 20 MG/ML
INJECTION, SOLUTION EPIDURAL; INFILTRATION; INTRACAUDAL; PERINEURAL AS NEEDED
Status: DISCONTINUED | OUTPATIENT
Start: 2022-10-11 | End: 2022-10-11 | Stop reason: HOSPADM

## 2022-10-11 RX ORDER — ROCURONIUM BROMIDE 10 MG/ML
INJECTION, SOLUTION INTRAVENOUS AS NEEDED
Status: DISCONTINUED | OUTPATIENT
Start: 2022-10-11 | End: 2022-10-11 | Stop reason: HOSPADM

## 2022-10-11 RX ORDER — SODIUM CHLORIDE 0.9 % (FLUSH) 0.9 %
5-40 SYRINGE (ML) INJECTION AS NEEDED
Status: DISCONTINUED | OUTPATIENT
Start: 2022-10-11 | End: 2022-10-13 | Stop reason: HOSPADM

## 2022-10-11 RX ORDER — POLYETHYLENE GLYCOL 3350 17 G/17G
17 POWDER, FOR SOLUTION ORAL DAILY PRN
Status: DISCONTINUED | OUTPATIENT
Start: 2022-10-11 | End: 2022-10-13 | Stop reason: HOSPADM

## 2022-10-11 RX ORDER — SUCCINYLCHOLINE CHLORIDE 20 MG/ML
INJECTION INTRAMUSCULAR; INTRAVENOUS AS NEEDED
Status: DISCONTINUED | OUTPATIENT
Start: 2022-10-11 | End: 2022-10-11 | Stop reason: HOSPADM

## 2022-10-11 RX ORDER — MORPHINE SULFATE 2 MG/ML
2 INJECTION, SOLUTION INTRAMUSCULAR; INTRAVENOUS
Status: DISCONTINUED | OUTPATIENT
Start: 2022-10-11 | End: 2022-10-13

## 2022-10-11 RX ORDER — HYDROMORPHONE HYDROCHLORIDE 1 MG/ML
0.5 INJECTION, SOLUTION INTRAMUSCULAR; INTRAVENOUS; SUBCUTANEOUS
Status: DISCONTINUED | OUTPATIENT
Start: 2022-10-11 | End: 2022-10-11 | Stop reason: HOSPADM

## 2022-10-11 RX ORDER — ROPIVACAINE HYDROCHLORIDE 2 MG/ML
INJECTION, SOLUTION EPIDURAL; INFILTRATION; PERINEURAL
Status: COMPLETED | OUTPATIENT
Start: 2022-10-11 | End: 2022-10-11

## 2022-10-11 RX ORDER — ACETAMINOPHEN 325 MG/1
650 TABLET ORAL ONCE
Status: COMPLETED | OUTPATIENT
Start: 2022-10-11 | End: 2022-10-11

## 2022-10-11 RX ORDER — DEXTROSE, SODIUM CHLORIDE, SODIUM LACTATE, POTASSIUM CHLORIDE, AND CALCIUM CHLORIDE 5; .6; .31; .03; .02 G/100ML; G/100ML; G/100ML; G/100ML; G/100ML
75 INJECTION, SOLUTION INTRAVENOUS CONTINUOUS
Status: DISPENSED | OUTPATIENT
Start: 2022-10-11 | End: 2022-10-12

## 2022-10-11 RX ORDER — ONDANSETRON 4 MG/1
4 TABLET, ORALLY DISINTEGRATING ORAL
Status: DISCONTINUED | OUTPATIENT
Start: 2022-10-11 | End: 2022-10-13 | Stop reason: HOSPADM

## 2022-10-11 RX ORDER — PROPOFOL 10 MG/ML
INJECTION, EMULSION INTRAVENOUS AS NEEDED
Status: DISCONTINUED | OUTPATIENT
Start: 2022-10-11 | End: 2022-10-11 | Stop reason: HOSPADM

## 2022-10-11 RX ORDER — CELECOXIB 100 MG/1
200 CAPSULE ORAL DAILY
Status: DISCONTINUED | OUTPATIENT
Start: 2022-10-12 | End: 2022-10-11

## 2022-10-11 RX ORDER — ENOXAPARIN SODIUM 100 MG/ML
40 INJECTION SUBCUTANEOUS DAILY
Status: DISCONTINUED | OUTPATIENT
Start: 2022-10-11 | End: 2022-10-13 | Stop reason: HOSPADM

## 2022-10-11 RX ORDER — OXYCODONE HYDROCHLORIDE 10 MG/1
10 TABLET ORAL
Status: DISCONTINUED | OUTPATIENT
Start: 2022-10-11 | End: 2022-10-13 | Stop reason: HOSPADM

## 2022-10-11 RX ORDER — SODIUM CHLORIDE, SODIUM LACTATE, POTASSIUM CHLORIDE, CALCIUM CHLORIDE 600; 310; 30; 20 MG/100ML; MG/100ML; MG/100ML; MG/100ML
INJECTION, SOLUTION INTRAVENOUS
Status: DISCONTINUED | OUTPATIENT
Start: 2022-10-11 | End: 2022-10-11 | Stop reason: HOSPADM

## 2022-10-11 RX ORDER — ONDANSETRON 2 MG/ML
4 INJECTION INTRAMUSCULAR; INTRAVENOUS
Status: DISCONTINUED | OUTPATIENT
Start: 2022-10-11 | End: 2022-10-11 | Stop reason: SDUPTHER

## 2022-10-11 RX ORDER — HYDROMORPHONE HYDROCHLORIDE 1 MG/ML
1 INJECTION, SOLUTION INTRAMUSCULAR; INTRAVENOUS; SUBCUTANEOUS
Status: DISCONTINUED | OUTPATIENT
Start: 2022-10-11 | End: 2022-10-13

## 2022-10-11 RX ORDER — MORPHINE SULFATE 2 MG/ML
1 INJECTION, SOLUTION INTRAMUSCULAR; INTRAVENOUS
Status: COMPLETED | OUTPATIENT
Start: 2022-10-11 | End: 2022-10-11

## 2022-10-11 RX ORDER — POLYETHYLENE GLYCOL 3350 17 G/17G
17 POWDER, FOR SOLUTION ORAL DAILY PRN
Status: DISCONTINUED | OUTPATIENT
Start: 2022-10-11 | End: 2022-10-11

## 2022-10-11 RX ORDER — DEXAMETHASONE SODIUM PHOSPHATE 4 MG/ML
INJECTION, SOLUTION INTRA-ARTICULAR; INTRALESIONAL; INTRAMUSCULAR; INTRAVENOUS; SOFT TISSUE
Status: COMPLETED | OUTPATIENT
Start: 2022-10-11 | End: 2022-10-11

## 2022-10-11 RX ORDER — AMOXICILLIN 250 MG
1 CAPSULE ORAL 2 TIMES DAILY
Status: DISCONTINUED | OUTPATIENT
Start: 2022-10-11 | End: 2022-10-13 | Stop reason: HOSPADM

## 2022-10-11 RX ORDER — SODIUM CHLORIDE 0.9 % (FLUSH) 0.9 %
5-40 SYRINGE (ML) INJECTION EVERY 8 HOURS
Status: DISCONTINUED | OUTPATIENT
Start: 2022-10-11 | End: 2022-10-13 | Stop reason: HOSPADM

## 2022-10-11 RX ORDER — ACETAMINOPHEN 325 MG/1
650 TABLET ORAL
Status: DISCONTINUED | OUTPATIENT
Start: 2022-10-11 | End: 2022-10-13 | Stop reason: HOSPADM

## 2022-10-11 RX ADMIN — SODIUM CHLORIDE, SODIUM LACTATE, POTASSIUM CHLORIDE, AND CALCIUM CHLORIDE: 600; 310; 30; 20 INJECTION, SOLUTION INTRAVENOUS at 15:12

## 2022-10-11 RX ADMIN — VANCOMYCIN HYDROCHLORIDE 1000 MG: 1 INJECTION, POWDER, LYOPHILIZED, FOR SOLUTION INTRAVENOUS at 14:31

## 2022-10-11 RX ADMIN — ONDANSETRON 4 MG: 2 INJECTION INTRAMUSCULAR; INTRAVENOUS at 16:49

## 2022-10-11 RX ADMIN — SODIUM CHLORIDE, SODIUM LACTATE, POTASSIUM CHLORIDE, CALCIUM CHLORIDE AND DEXTROSE MONOHYDRATE 75 ML/HR: 5; 600; 310; 30; 20 INJECTION, SOLUTION INTRAVENOUS at 20:37

## 2022-10-11 RX ADMIN — DEXAMETHASONE SODIUM PHOSPHATE 4 MG: 4 INJECTION, SOLUTION INTRAMUSCULAR; INTRAVENOUS at 16:46

## 2022-10-11 RX ADMIN — CEFAZOLIN SODIUM 2 G: 1 INJECTION, POWDER, FOR SOLUTION INTRAMUSCULAR; INTRAVENOUS at 15:30

## 2022-10-11 RX ADMIN — ACETAMINOPHEN 650 MG: 325 TABLET, FILM COATED ORAL at 23:29

## 2022-10-11 RX ADMIN — TRANEXAMIC ACID 1 G: 100 INJECTION, SOLUTION INTRAVENOUS at 16:48

## 2022-10-11 RX ADMIN — DEXAMETHASONE SODIUM PHOSPHATE 8 MG: 4 INJECTION, SOLUTION INTRAMUSCULAR; INTRAVENOUS at 15:31

## 2022-10-11 RX ADMIN — GLYCOPYRROLATE 0.4 MG: 0.2 INJECTION INTRAMUSCULAR; INTRAVENOUS at 17:16

## 2022-10-11 RX ADMIN — FAMOTIDINE 20 MG: 20 TABLET ORAL at 10:48

## 2022-10-11 RX ADMIN — DEXMEDETOMIDINE HYDROCHLORIDE 10 MCG: 100 INJECTION, SOLUTION, CONCENTRATE INTRAVENOUS at 16:02

## 2022-10-11 RX ADMIN — NEOSTIGMINE METHYLSULFATE 3 MG: 1 INJECTION, SOLUTION INTRAVENOUS at 17:16

## 2022-10-11 RX ADMIN — TRANEXAMIC ACID 1 G: 100 INJECTION, SOLUTION INTRAVENOUS at 15:22

## 2022-10-11 RX ADMIN — WATER 2 G: 1 INJECTION INTRAMUSCULAR; INTRAVENOUS; SUBCUTANEOUS at 23:27

## 2022-10-11 RX ADMIN — LIDOCAINE HYDROCHLORIDE 80 MG: 20 INJECTION, SOLUTION EPIDURAL; INFILTRATION; INTRACAUDAL; PERINEURAL at 15:15

## 2022-10-11 RX ADMIN — CELECOXIB 200 MG: 100 CAPSULE ORAL at 15:00

## 2022-10-11 RX ADMIN — ROCURONIUM BROMIDE 20 MG: 50 INJECTION INTRAVENOUS at 16:11

## 2022-10-11 RX ADMIN — FENTANYL CITRATE 100 MCG: 50 INJECTION, SOLUTION INTRAMUSCULAR; INTRAVENOUS at 15:13

## 2022-10-11 RX ADMIN — MORPHINE SULFATE 1 MG: 2 INJECTION, SOLUTION INTRAMUSCULAR; INTRAVENOUS at 05:46

## 2022-10-11 RX ADMIN — SENNOSIDES AND DOCUSATE SODIUM 1 TABLET: 50; 8.6 TABLET ORAL at 21:08

## 2022-10-11 RX ADMIN — SUCCINYLCHOLINE CHLORIDE 100 MG: 20 INJECTION, SOLUTION INTRAMUSCULAR; INTRAVENOUS at 15:15

## 2022-10-11 RX ADMIN — SODIUM CHLORIDE, SODIUM LACTATE, POTASSIUM CHLORIDE, AND CALCIUM CHLORIDE: 600; 310; 30; 20 INJECTION, SOLUTION INTRAVENOUS at 17:05

## 2022-10-11 RX ADMIN — ACETAMINOPHEN 650 MG: 325 TABLET ORAL at 14:38

## 2022-10-11 RX ADMIN — ROCURONIUM BROMIDE 5 MG: 50 INJECTION INTRAVENOUS at 15:15

## 2022-10-11 RX ADMIN — ROPIVACAINE HYDROCHLORIDE 20 MG: 2 INJECTION, SOLUTION EPIDURAL; INFILTRATION at 15:31

## 2022-10-11 RX ADMIN — MORPHINE SULFATE 2 MG: 2 INJECTION, SOLUTION INTRAMUSCULAR; INTRAVENOUS at 13:31

## 2022-10-11 RX ADMIN — Medication 5 MG: at 15:21

## 2022-10-11 RX ADMIN — DEXMEDETOMIDINE HYDROCHLORIDE 10 MCG: 100 INJECTION, SOLUTION, CONCENTRATE INTRAVENOUS at 16:00

## 2022-10-11 RX ADMIN — ROCURONIUM BROMIDE 25 MG: 50 INJECTION INTRAVENOUS at 15:30

## 2022-10-11 RX ADMIN — SODIUM CHLORIDE, PRESERVATIVE FREE 10 ML: 5 INJECTION INTRAVENOUS at 21:42

## 2022-10-11 RX ADMIN — MORPHINE SULFATE 2 MG: 2 INJECTION, SOLUTION INTRAMUSCULAR; INTRAVENOUS at 10:48

## 2022-10-11 RX ADMIN — MORPHINE SULFATE 2 MG: 2 INJECTION, SOLUTION INTRAMUSCULAR; INTRAVENOUS at 04:05

## 2022-10-11 RX ADMIN — Medication 15 MG: at 15:25

## 2022-10-11 RX ADMIN — PROPOFOL 130 MG: 10 INJECTION, EMULSION INTRAVENOUS at 15:15

## 2022-10-11 RX ADMIN — ONDANSETRON 4 MG: 2 INJECTION INTRAMUSCULAR; INTRAVENOUS at 10:48

## 2022-10-11 RX ADMIN — Medication 10 MG: at 15:29

## 2022-10-11 RX ADMIN — HYDROMORPHONE HYDROCHLORIDE 0.5 MG: 1 INJECTION, SOLUTION INTRAMUSCULAR; INTRAVENOUS; SUBCUTANEOUS at 17:32

## 2022-10-11 NOTE — ROUTINE PROCESS
TRANSFER - IN REPORT:    Verbal report received from [place], RN on Mavis Murphy  being received from ER for ordered procedure      Report consisted of patients Situation, Background, Assessment and   Recommendations(SBAR). Information from the following report(s) SBAR was reviewed with the receiving nurse. Opportunity for questions and clarification was provided. Assessment completed upon patients arrival to unit and care assumed.

## 2022-10-11 NOTE — PERIOP NOTES
TRANSFER - OUT REPORT:    Verbal report given to Michael(name) on Em Amador  being transferred to (unit) for routine post - op       Report consisted of patients Situation, Background, Assessment and   Recommendations(SBAR). Information from the following report(s) SBAR, Kardex, Procedure Summary, Intake/Output, and MAR was reviewed with the receiving nurse. Lines:   Peripheral IV 10/11/22 Left Antecubital (Active)   Site Assessment Clean, dry, & intact 10/11/22 1723   Phlebitis Assessment 0 10/11/22 1723   Infiltration Assessment 0 10/11/22 1723   Dressing Status Clean, dry, & intact 10/11/22 1723   Dressing Type Transparent;Tape 10/11/22 1723   Hub Color/Line Status Pink; Infusing 10/11/22 1723        Opportunity for questions and clarification was provided.       Patient transported with:   Hospital Transporter

## 2022-10-11 NOTE — ANESTHESIA PROCEDURE NOTES
Peripheral Block    Start time: 10/11/2022 3:20 PM  End time: 10/11/2022 3:30 PM  Performed by: Yue Sams MD  Authorized by: Yue Sams MD       Pre-procedure:    Indications: at surgeon's request, post-op pain management and procedure for pain    Preanesthetic Checklist: patient identified, risks and benefits discussed, site marked, timeout performed, anesthesia consent given, patient being monitored and fire risk safety assessment completed and verbalized      Block Type:   Block Type:  Fascia iliaca  Laterality:  Right  Monitoring:  Standard ASA monitoring, continuous pulse ox, frequent vital sign checks, oxygen, heart rate and responsive to questions  Injection Technique:  Single shot  Procedures: other (comment)    Patient Position: supine  Prep: chlorhexidine    Location:  Upper thigh  Needle Type:  Stimuplex  Needle Gauge:  21 G  Needle Localization:  Anatomical landmarks  Medication Injected:  Dexamethasone (DECADRON) 4 mg/mL injection - Peripheral Nerve Block   8 mg - 10/11/2022 3:31:00 PM  ropivacaine (NAROPIN) 2 mg/mL (0.2 %) injection - Peripheral Nerve Block   20 mg - 10/11/2022 3:31:00 PM  Med Admin Time: 10/11/2022 3:31 PM    Assessment:  Number of attempts:  1  Injection Assessment:  No paresthesia, incremental injection every 5 mL, no intravascular symptoms, negative aspiration for blood and local visualized surrounding nerve on ultrasound  Patient tolerance:  Patient tolerated the procedure well with no immediate complications  Location:  [facility]    VSS in or, unable to print US    10/11/2022     3:30 PM     Nahomi Linn MD

## 2022-10-11 NOTE — H&P
History & Physical    Patient: Saroj Rojas MRN: [id number]  CSN: [id number]    YOB: 1945  Age: 67 y.o. Sex: female      DOA: 10/11/2022    Chief Complaint:   Chief Complaint   Patient presents with    Hip Pain          HPI:     Saroj Rojas is a 67 y.o.  female with a past medical history of GERD who presents to the ED after a ground level fall at home. The patient notes she was walking from the dining room to the living room and fell and landed on her right hip. She denies dizziness, lightheadedness prior to falling. The patient notes that she just tripped on something and then fell. Patient reports after the fall she started to have severe right hip pain. She eventually called EMS. Patient notes that she has no significant past medical history other than GERD. Chart review she is to be on antihypertensives in the past but the patient denies ever having used them. Upon presentation to the emergency department the patient was noted to have hyper tensive urgency with a blood pressure in the 190s. Her other vitals were relatively normal.  Her labs were unremarkable except for a white blood cell count of 31.4. Her potassium was slightly low at 3.4. Total bilirubin was elevated at 1.5. A x-ray of the chest showed bibasilar atelectasis with no infiltrate and an x-ray of the right hip showed a displaced subcapital right hip fracture. The patient was given 3 mg of morphine total for her pain and the orthopedic service was consulted in the ED. Past Medical History:   Diagnosis Date    Chronic GERD      ALL: NKDA (pollen allergy)  Past surgeries: Gallbladder removal  SH: Denies smoking, drinking, drugs. . No family history on file. Social History     Socioeconomic History    Marital status:        Prior to Admission medications    Medication Sig Start Date End Date Taking?  Authorizing Provider   famotidine (PEPCID) 20 mg tablet Take 1 Tab by mouth two (2) times a day. Indications: Dyspepsia 3/11/18   Zohreh Penaloza MD   metoprolol tartrate (LOPRESSOR) 50 mg tablet Take 1 Tab by mouth every twelve (12) hours. 3/11/18   Zohreh Penaloza MD   ondansetron (ZOFRAN ODT) 4 mg disintegrating tablet Take 1 Tab by mouth every six (6) hours as needed. Patient taking differently: Take 1 Tab by mouth every six (6) hours as needed for Nausea. 3/11/18   Zohreh Penaloza MD   cloNIDine HCl (CATAPRES) 0.1 mg tablet Take 1 Tab by mouth three (3) times daily as needed. SBP above 160 3/11/18   Zohreh Penaloza MD   HYDROcodone-acetaminophen [facility]) 7.5-325 mg per tablet Take 1-2 Tabs by mouth every four (4) hours as needed. Max Daily Amount: 12 Tabs. Indications: Pain 3/9/18   Yan Nunez MD       No Known Allergies      Review of Systems  GENERAL: Patient alert, awake and oriented times 3, able to communicate full sentences and not in distress. no weight loss, no falls. HEENT: No change in vision, no earache, no tinnitus, no sore throat or sinus congestion. NECK: No pain or stiffness. PULMONARY: No shortness of breath, no cough or wheeze. Cardiovascular: no pnd or orthopnea, no CP  GASTROINTESTINAL: No abdominal pain, no nausea, no vomiting or diarrhea, no melena or bright red blood per rectum. GENITOURINARY: No urinary frequency, no urgency, no hesitancy or dysuria. MUSCULOSKELETAL: Tenderness to right hip. DERMATOLOGIC: No rash, no itching, no lesions. ENDOCRINE: No polyuria, no polydipsia, no heat or cold intolerance. No recent change in weight. HEMATOLOGICAL: No anemia or easy bruising or bleeding. NEUROLOGIC: No headache, no seizures, no numbness, no tingling or weakness.        Physical Exam:     Physical Exam:  Visit Vitals  BP (!) 195/70 (BP 1 Location: Right arm, BP Patient Position: Lying)   Pulse 94   Temp 97.7 °F (36.5 °C)   Resp 16   Ht 5' 4\" (1.626 m)   Wt 54.4 kg (120 lb)   SpO2 99%   BMI 20.60 kg/m²      O2 Device: None (Room air)    Temp (24hrs), Av.7 °F (36.5 °C), Min:97.7 °F (36.5 °C), Max:97.7 °F (36.5 °C)    No intake/output data recorded. No intake/output data recorded. General:  Alert, cooperative, no distress, appears stated age. Head: Normocephalic, without obvious abnormality, atraumatic. Eyes:  Conjunctivae/corneas clear. PERRL, EOMs intact. Nose: Nares normal. No drainage or sinus tenderness. Neck: Supple, symmetrical, trachea midline, no adenopathy, thyroid: no enlargement, no carotid bruit and no JVD. Lungs:   Clear to auscultation bilaterally. Heart:  Regular rate and rhythm, S1, S2 normal.     Abdomen: Soft, non-tender. Bowel sounds normal.    Extremities: Extremities normal, no cyanosis or edema. Right lower extremity shorter than left, pain with movement. Pulses intact bilaterally in lower extremities. Pulses: 2+ and symmetric all extremities. Skin:  No rashes or lesions   Neurologic: AAOx3, No focal motor or sensory deficit. Labs Reviewed: All lab results for the last 24 hours reviewed. CXR, Xray Right Hip, and EKG    Procedures/imaging: see electronic medical records for all procedures/Xrays and details which were not copied into this note but were reviewed prior to creation of Plan      Assessment/Plan     Right hip fracture  Reports a ground-level fall at home and subsequent right hip pain. X-ray of right hip shows a displaced closed right hip fracture. The orthopedic service is aware and is planning on hip repair on 10/11/2022.  -Orthopedic service consulted  -Admit to orthopedic bed  -Morphine 2 g IV every 2 hours as needed for pain  -Tylenol for pain as well  -SCDs for DVT prophylaxis  -Holding Lovenox in setting of upcoming orthopedic procedure    2. Hypertension  Leslye related to patient's acute right hip fracture.   Hesitation her blood pressure was 195/70 but with administration of pain control her blood pressure dropped to 151/69.  -Continue to monitor as pain is likely driving hypertension    3. Hyperglycemia  Blood sugar noted to be 118. Check A1c.  -A1c    4. GERD  - pepcid    5. Leukocytosis  Patient noted to have WBC of 30k >. Likely in the setting of a stress response. Urine looks noninfectious     DVT/GI Prophylaxis: SCD's    Discussed with patient and  at bedside about hospital admission and my plan care, both understood and agree with my plan care. Jorge L Cortes DO  10/11/2022 5:08 AM    Disclaimer: Sections of this note are dictated using utilizing voice recognition software. Minor typographical errors may be present. If questions arise, please do not hesitate to contact me or call our department.

## 2022-10-11 NOTE — ED PROVIDER NOTES
EMERGENCY DEPARTMENT HISTORY AND PHYSICAL EXAM    4:32 AM      Date: 10/11/2022  Patient Name: Grisel Mcfarlane    History of Presenting Illness     Chief Complaint   Patient presents with    Hip Pain         History Provided By: Patient  Location/Duration/Severity/Modifying factors   The patient is a 58-year-old female with a history of GERD otherwise healthy the presents emergency department complaint of right hip pain. Patient yesterday was walking and fell in the living room and landed on her right hip. Patient says she can get up and she gets in the chair and she went to bed because he was having some pain and then when she got in bed she could no longer putting weight on her hip and needed full support to get around the house or go to the bathroom. Patient's pain continue to become more severe so EMS was called. Patient took some ibuprofen for the pain without any relief. Patient denies any other injuries, denies hitting her head, denies loss of consciousness, denies being on blood thinner. The patient is not a smoker, drinker, nor drug user. The patient used to work for the health department. PCP: Aretha Lees MD    Current Outpatient Medications   Medication Sig Dispense Refill    famotidine (PEPCID) 20 mg tablet Take 1 Tab by mouth two (2) times a day. Indications: Dyspepsia 60 Tab 0    metoprolol tartrate (LOPRESSOR) 50 mg tablet Take 1 Tab by mouth every twelve (12) hours. 60 Tab 0    ondansetron (ZOFRAN ODT) 4 mg disintegrating tablet Take 1 Tab by mouth every six (6) hours as needed. (Patient taking differently: Take 1 Tab by mouth every six (6) hours as needed for Nausea.) 30 Tab 0    cloNIDine HCl (CATAPRES) 0.1 mg tablet Take 1 Tab by mouth three (3) times daily as needed. SBP above 160 30 Tab 0    HYDROcodone-acetaminophen (NORCO) 7.5-325 mg per tablet Take 1-2 Tabs by mouth every four (4) hours as needed. Max Daily Amount: 12 Tabs.  Indications: Pain 20 Tab 0       Past History Past Medical History:  Past Medical History:   Diagnosis Date    Chronic GERD        Past Surgical History:  No past surgical history on file. Family History:  No family history on file. Social History: Allergies:  No Known Allergies      Review of Systems       Review of Systems   Constitutional:  Negative for activity change, fatigue and fever. HENT:  Negative for congestion and rhinorrhea. Eyes:  Negative for visual disturbance. Respiratory:  Negative for shortness of breath. Cardiovascular:  Negative for chest pain and palpitations. Gastrointestinal:  Negative for abdominal pain, diarrhea, nausea and vomiting. Genitourinary:  Negative for dysuria and hematuria. Musculoskeletal:  Positive for arthralgias and gait problem. Negative for back pain. Skin:  Negative for rash. Neurological:  Negative for dizziness, weakness and light-headedness. All other systems reviewed and are negative. Physical Exam   Visit Vitals  BP (!) 195/70 (BP 1 Location: Right arm, BP Patient Position: Lying)   Pulse 94   Temp 97.7 °F (36.5 °C)   Resp 16   Ht 5' 4\" (1.626 m)   Wt 54.4 kg (120 lb)   SpO2 99%   BMI 20.60 kg/m²         Physical Exam  Vitals and nursing note reviewed. Constitutional:       General: She is not in acute distress. Appearance: She is well-developed. HENT:      Head: Normocephalic and atraumatic. Right Ear: External ear normal.      Left Ear: External ear normal.      Nose: Nose normal.   Eyes:      General: No scleral icterus. Conjunctiva/sclera: Conjunctivae normal.      Pupils: Pupils are equal, round, and reactive to light. Neck:      Thyroid: No thyromegaly. Vascular: No JVD. Trachea: No tracheal deviation. Cardiovascular:      Rate and Rhythm: Normal rate and regular rhythm. Heart sounds: Normal heart sounds. No murmur heard. No friction rub. No gallop.    Pulmonary:      Effort: Pulmonary effort is normal.      Breath sounds: Normal breath sounds. Chest:      Chest wall: No tenderness. Abdominal:      General: Bowel sounds are normal. There is no distension. Palpations: Abdomen is soft. Tenderness: There is no abdominal tenderness. There is no guarding or rebound. Musculoskeletal:         General: Tenderness and signs of injury present. Cervical back: Normal range of motion and neck supple. Comments: Right lower extremity shorter than left, pain with internal/external rotation, distal pulses are equal   Lymphadenopathy:      Cervical: No cervical adenopathy. Skin:     General: Skin is warm and dry. Neurological:      Mental Status: She is alert and oriented to person, place, and time. Cranial Nerves: No cranial nerve deficit. Coordination: Coordination normal.      Comments: Limited mobility the right lower extremity, other extremities normal that are unaffected   Psychiatric:      Comments: No family currently at bedside         Diagnostic Study Results     Labs -  Recent Results (from the past 12 hour(s))   CBC WITH AUTOMATED DIFF    Collection Time: 10/11/22  3:25 AM   Result Value Ref Range    WBC 31.4 (H) 4.6 - 13.2 K/uL    RBC 4.42 4.20 - 5.30 M/uL    HGB 12.7 12.0 - 16.0 g/dL    HCT 37.3 35.0 - 45.0 %    MCV 84.4 78.0 - 100.0 FL    MCH 28.7 24.0 - 34.0 PG    MCHC 34.0 31.0 - 37.0 g/dL    RDW 12.9 11.6 - 14.5 %    PLATELET 083 052 - 237 K/uL    MPV 10.1 9.2 - 11.8 FL    NRBC 0.0 0  WBC    ABSOLUTE NRBC 0.00 0.00 - 0.01 K/uL    NEUTROPHILS PENDING %    LYMPHOCYTES PENDING %    MONOCYTES PENDING %    EOSINOPHILS PENDING %    BASOPHILS PENDING %    IMMATURE GRANULOCYTES PENDING %    ABS. NEUTROPHILS PENDING K/UL    ABS. LYMPHOCYTES PENDING K/UL    ABS. MONOCYTES PENDING K/UL    ABS. EOSINOPHILS PENDING K/UL    ABS. BASOPHILS PENDING K/UL    ABS. IMM. GRANS.  PENDING K/UL    DF PENDING    TYPE & SCREEN    Collection Time: 10/11/22  3:25 AM   Result Value Ref Range    Crossmatch Expiration 10/14/2022,2359     ABO/Rh(D) Zenaida Cook POSITIVE     Antibody screen NEG    PROTHROMBIN TIME + INR    Collection Time: 10/11/22  3:25 AM   Result Value Ref Range    Prothrombin time 12.8 11.5 - 15.2 sec    INR 0.9 0.8 - 1.2     PTT    Collection Time: 10/11/22  3:25 AM   Result Value Ref Range    aPTT 25.8 23.0 - 36.4 SEC       Radiologic Studies -   XR HIP RT W OR WO PELV  1 VW    (Results Pending)   XR CHEST SNGL V    (Results Pending)   X-ray right hip: Displaced subcapital right hip fracture interpreted by me  X-ray chest: Bibasilar atelectasis, no infiltrate interpreted by me      Medical Decision Making   I am the first provider for this patient. I reviewed the vital signs, available nursing notes, past medical history, past surgical history, family history and social history. Vital Signs-Reviewed the patient's vital signs. EKG:     Records Reviewed: Nursing Notes, Old Medical Records, Previous Radiology Studies, and Previous Laboratory Studies (Time of Review: 4:33 AM)    ED Course: Progress Notes, Reevaluation, and Consults: The patient's x-ray suggestive of a subcapital right hip fracture and discussed the case over secure text with Dr. Avril Mclaughlin and would like to be sent to be n.p.o. for the OR this afternoon. The patient will need a right hip replacement. Would like the patient to be admitted to the hospitalist service and the patient has not seen a primary doctor in several years so will need to be evaluated. I discussed case with the hospitalist and will admit the patient for further care.     Provider Notes (Medical Decision Making):   MDM  Number of Diagnoses or Management Options  Closed fracture of right hip, initial encounter [facility])  Diagnosis management comments: Patient is a 65-year-old female with a history of GERD that presents emergency department after a fall yesterday and ability to weight-bear and ambulate for several hours then worsened overnight now with a shortened right lower extremity. The patient likely has hip fracture based on my evaluation and will follow the patient's x-ray, preoperative labs, then reevaluate. Procedures        Diagnosis     Clinical Impression:   1. Closed fracture of right hip, initial encounter ([facility] [address]        Disposition: Admit     Follow-up Information    None          Patient's Medications   Start Taking    No medications on file   Continue Taking    CLONIDINE HCL (CATAPRES) 0.1 MG TABLET    Take 1 Tab by mouth three (3) times daily as needed. SBP above 160    FAMOTIDINE (PEPCID) 20 MG TABLET    Take 1 Tab by mouth two (2) times a day. Indications: Dyspepsia    HYDROCODONE-ACETAMINOPHEN (NORCO) 7.5-325 MG PER TABLET    Take 1-2 Tabs by mouth every four (4) hours as needed. Max Daily Amount: 12 Tabs. Indications: Pain    METOPROLOL TARTRATE (LOPRESSOR) 50 MG TABLET    Take 1 Tab by mouth every twelve (12) hours. ONDANSETRON (ZOFRAN ODT) 4 MG DISINTEGRATING TABLET    Take 1 Tab by mouth every six (6) hours as needed. These Medications have changed    No medications on file   Stop Taking    No medications on file     Disclaimer: Sections of this note are dictated using utilizing voice recognition software. Minor typographical errors may be present. If questions arise, please do not hesitate to contact me or call our department.

## 2022-10-11 NOTE — ANESTHESIA PREPROCEDURE EVALUATION
Anesthetic History   No history of anesthetic complications            Review of Systems / Medical History  Patient summary reviewed and pertinent labs reviewed    Pulmonary  Within defined limits                 Neuro/Psych   Within defined limits           Cardiovascular                  Exercise tolerance: >4 METS     GI/Hepatic/Renal     GERD: well controlled           Endo/Other        Arthritis     Other Findings   Comments: Documentation of current medication  Current medications obtained, documented and obtained? YES      Risk Factors for Postoperative nausea/vomiting:       History of postoperative nausea/vomiting? NO       Female? YES       Motion sickness? NO       Intended opioid administration for postoperative analgesia? YES      Smoking Abstinence:  Current Smoker? NO  Elective Surgery? YES  Seen preoperatively by anesthesiologist or proxy prior to day of surgery? YES  Pt abstained from smoking 24 hours prior to anesthesia?  YES    Preventive care/screening for High Blood Pressure:  Aged 18 years and older: YES  Screened for high blood pressure: YES  Patients with high blood pressure referred to primary care provider   for BP management: YES                     Physical Exam    Airway  Mallampati: II  TM Distance: 4 - 6 cm  Neck ROM: normal range of motion   Mouth opening: Normal     Cardiovascular    Rhythm: regular  Rate: normal         Dental  No notable dental hx       Pulmonary  Breath sounds clear to auscultation               Abdominal  GI exam deferred       Other Findings            Anesthetic Plan    ASA: 2  Anesthesia type: general      Post-op pain plan if not by surgeon: peripheral nerve block single    Induction: Intravenous  Anesthetic plan and risks discussed with: Patient

## 2022-10-11 NOTE — ROUTINE PROCESS
TRANSFER - IN REPORT:    Verbal report received from Derick Wall RN(name) on Sameera Victor  being received from [facility]) for routine post - op      Report consisted of patients Situation, Background, Assessment and   Recommendations(SBAR). Information from the following report(s) SBAR was reviewed with the receiving nurse. Opportunity for questions and clarification was provided. Assessment completed upon patients arrival to unit and care assumed.

## 2022-10-11 NOTE — CONSULTS
Healthy 68 yo female  Displaced R femoral neck fx  Admit to hospitalist  To OR for R THR  Full note to follow

## 2022-10-11 NOTE — ED NOTES
Per Dr. Rosa Zamora. Give 1 mg morphine IVP now for right hip pain. Also, no new orders reference pt WBC's at this time.

## 2022-10-11 NOTE — CONSULTS
Consult    Patient: Gurpreet Valle MRN: [id number]  SSN: [id number]    YOB: 1945  Age: 67 y.o. Sex: female      Subjective: Gurpreet Valle is a 67 y.o. healthy female referred by the ED provider for right hip pain s/p fall. Pt fell at home yesterday. She tripped over her 3 yo mathis retriever puppy and slipped on her hardwood while walking through her dining room  She felt immediate pain and was unable to bear weight. She was transported to [address] [place] [address] where x rays revealed a displaced right femoral neck fracture. She lives in [place] with her . Her  is a disabled limited ambulator due to a chronic back condition. She has one son who lives in . She states that her 3 yo mathis retriever Kash Lambert is a terror. Past Medical History:   Diagnosis Date    Chronic GERD      No past surgical history on file. No family history on file.   Social History     Tobacco Use    Smoking status: Not on file    Smokeless tobacco: Not on file   Substance Use Topics    Alcohol use: Not on file      Current Facility-Administered Medications   Medication Dose Route Frequency Provider Last Rate Last Admin    famotidine (PEPCID) tablet 20 mg  20 mg Oral BID Margarette Li L, DO        sodium chloride (NS) flush 5-40 mL  5-40 mL IntraVENous Q8H Margarette Li L, DO        sodium chloride (NS) flush 5-40 mL  5-40 mL IntraVENous PRN Lonzell Bess, DO        acetaminophen (TYLENOL) tablet 650 mg  650 mg Oral Q6H PRN Lonzell Bess, DO        Or    acetaminophen (TYLENOL) suppository 650 mg  650 mg Rectal Q6H PRN Lonzell Bess, DO        polyethylene glycol (MIRALAX) packet 17 g  17 g Oral DAILY PRN Lonzell Bess, DO        ondansetron (ZOFRAN ODT) tablet 4 mg  4 mg Oral Q8H PRN Lonzell Bess, DO        Or    ondansetron (ZOFRAN) injection 4 mg  4 mg IntraVENous Q6H PRN Lonzell Bess, DO        [Held by provider] enoxaparin (LOVENOX) injection 40 mg  40 mg SubCUTAneous DAILY Oscar Cote Anais Marin, DO        potassium bicarb-citric acid (EFFER-K) tablet 40 mEq  40 mEq Oral NOW Steffany Pipe L, DO        sodium chloride (NS) flush 5-40 mL  5-40 mL IntraVENous Q8H Steffany Pipe L, DO        morphine injection 2 mg  2 mg IntraVENous Q2H PRN Josh Peace, DO         Current Outpatient Medications   Medication Sig Dispense Refill    famotidine (PEPCID) 20 mg tablet Take 1 Tab by mouth two (2) times a day. Indications: Dyspepsia 60 Tab 0    metoprolol tartrate (LOPRESSOR) 50 mg tablet Take 1 Tab by mouth every twelve (12) hours. (Patient not taking: Reported on 10/11/2022) 60 Tab 0    ondansetron (ZOFRAN ODT) 4 mg disintegrating tablet Take 1 Tab by mouth every six (6) hours as needed. (Patient not taking: Reported on 10/11/2022) 30 Tab 0    cloNIDine HCl (CATAPRES) 0.1 mg tablet Take 1 Tab by mouth three (3) times daily as needed. SBP above 160 (Patient not taking: Reported on 10/11/2022) 30 Tab 0    HYDROcodone-acetaminophen (NORCO) 7.5-325 mg per tablet Take 1-2 Tabs by mouth every four (4) hours as needed. Max Daily Amount: 12 Tabs. Indications: Pain (Patient not taking: Reported on 10/11/2022) 20 Tab 0        No Known Allergies    Review of Systems:  A comprehensive review of systems was negative except for that written in the History of Present Illness. Objective:     Vitals:    10/11/22 0301 10/11/22 0401 10/11/22 0501 10/11/22 0600   BP: (!) 195/70 (!) 150/77 (!) 159/75 (!) 151/69   Pulse: 94      Resp: 16 16 16    Temp: 97.7 °F (36.5 °C)      SpO2: 99% 97% 96% 100%   Weight: 120 lb (54.4 kg)      Height: 5' 4\" (1.626 m)       Appearance: Alert, well appearing and pleasant patient who is in no distress, oriented to person, place/time, and who follows commands. Lying on a stretcher. HEENT: Mercedes Clemons hears well, does not require hearing aids. Her sclera of the eyes are non-icteric. She is breathing normally and no respiratory accessory muscle use is noted.  No JVD present and Neck ROM within normal limits. Psychiatric: Affect and mood are appropriate. Oriented x3  Cardiovascular/Peripheral Vascular: Normal pulses to each foot. Integumentary: No rashes. Warm and normal color. No drainage. Gait: Not tested due to fracture  Sensory Exam: Intact/Normal Sensation    Lymphatic: No evidence of Lymphedema  Vascular:       Pulses: palpable  Varicosities none  Wounds/Abrasion: None Present  Neuro: Negative, no tremors    Physical Exam:  EXTREMITIES:      Examination Left hip Right hip   Skin Intact Intact   External Rotation ROM 10 Not tested due to fracture   Internal Rotation ROM 10 \"   Trochanteric tenderness - \"   Hip flexion contracture - Not tested due to fracture   Antalgic gait Not tested due to fracture Not tested due to fracture   Trendelenberg sign \" \"   Lumbar tenderness - -   Straight leg raise - Not tested due to fracture   Calf tenderness - -   Neurovascular Intact Intact   Pain with any attempted right hip motion  right leg externally rotated and shortened    XR RIGHT HIP 10/11/22  IMPRESSION  Displaced right femoral neck fracture  I independently reviewed these images today. Assessment:     Hospital Problems  Date Reviewed: 3/9/2018            Codes Class Noted POA    * (Principal) Displaced fracture of right femoral neck ([facility] [address] ICD-10-CM: S72.001A  ICD-9-CM: 820.8  10/11/2022 Unknown        Primary osteoarthritis of both hips (Chronic) ICD-10-CM: M16.0  ICD-9-CM: 715.15  10/11/2022 Unknown        GERD (gastroesophageal reflux disease) (Chronic) ICD-10-CM: K21.9  ICD-9-CM: 530.81  10/11/2022 Unknown           Plan: To OR for R THR  Admit to Dr. Bin Saldivar  Risks of surgery outlined and informed consent obtained.     Signed By: Shaan Caicedo MD     October 11, 2022

## 2022-10-11 NOTE — OP NOTES
Operative Note      Patient: Grisel Mcfarlane     Date of Surgery: 10/11/2022         YOB: 1945      Age:  67 y.o.        LOS:  LOS: 0 days     MRN:   [id number]    Preoperative Diagnosis:  Displaced right femoral neck fracture, Osteoarthritis right hip    Postoperative Diagnosis: Same    Surgeon:  Ruth Tang MD    Assistant:  Jae Bess    Anesthesia:  general anesthesia and lumbar plexus block    Procedure:  Procedure(s):  RIGHT TOTAL HIP ARTHROPLASTY/MEDACTA/2 SA'S    Time out performed: YES    Estimated Blood Loss:  200 cc           Implants:    Implant Name Type Inv. Item Serial No.  Lot No. LRB No. Used Action   PLUG ACET MET CTRL H MPACT - [id number]  PLUG ACET MET CTRL H MPACT  [employer] [facility]_ [id number] Right 1 Implanted   SHELL ACET [id number] LNR SZ E 2 H MPACT - [id number]  SHELL ACET [id number] LNR SZ E 2 H MPACT  [employer] [facility]_ [id number] Right 1 Implanted   LINER ACET SZ E ID36MM FLAT UHMWPE HIGHCROSS MPACT - [id number]  LINER ACET SZ E ID36MM FLAT UHMWPE HIGHCROSS MPACT  [employer] [facility]_ [id number] Right 1 Implanted   SCREW BNE L15MM DIA6.5MM CANC FLAT HD DOME MPACT - [id number]  SCREW BNE L15MM DIA6.5MM CANC FLAT HD DOME MPACT  [employer] [facility]_ [id number] Right 1 Implanted   STEM FEM SZ 4 LAT + MASTERLOC - [id number]  STEM FEM SZ 4 LAT + MASTERLOC  [employer] [facility]_ [id number] Right 1 Implanted   12/14 TAPER SIZE LARGE 4MM OFFSET [address] [id number] Right 1 Implanted   HEAD FEM 36 MM BIOLOX OPT MECTACER - [id number]  HEAD FEM 39 MM BIOLOX OPT MECTACER  [employer] [facility]_ [id number] Right 1 Implanted       Specimens: * No specimens in log *            Complications:  None      DESCRIPTION OF PROCEDURE: After satisfactory general and lumbar plexus block anesthesia, in the supine position, patient's hip was prepped with Betacept and ChloroPrep solution and draped in the usual fashion for hip replacement.  An anterior hip incision was made and carried down through the subcutaneous tissue to the underlying fascia. The tensor fascia was retracted laterally and the anterior hip capsule was exposed. An anterior hip capsulectomy was performed exposing the arthritic hip joint. The femoral neck was osteotomized with an oscillating saw. The femoral head was removed with a corkscrew. The acetabulum was exposed with further capsulectomy and four quadrant retraction. The acetabulum was serially reamed to 1 mm less than the actual socket size of 52 mm. Once the acetabulum was fully prepared, the Impact porus coated 2 hole acetabular component was impacted securely into place in 15 degrees anteversion and 45 degrees abduction. A supplemental 15 mm screw was inserted in the acetabular dome area for additional fixation. The apical dome plug was screwed into place. The polyethylene liner was then impacted into place with good stability noted. The patient's leg was then brought around to the figure 4 position after first dropping the opposite leg slightly on the split leg table. The proximal femur was serially broached with the taper lock broach to the appropriate implant size of #4. Good broach stability was noted. A trial reduction was carried out. Good stability and equal leg lengths were noted. The hip was then dislocated and the trial components were removed. The wound was irrigated thoroughly with a pulsatile lavage. The actual size 4 [place] femoral component was then impacted firmly into place. The 36 mm +4 ceramic head component was impacted firmly into place on the clean Stewart taper of the Lat + offset femoral component. The hip was reduced and was found to be stable with equal leg lengths. The wound was again irrigated thoroughly, including Irricept lavage. A Hemovac drain was inserted. Closure was obtained using #2 Vicryl sutures for the fascia, 2-0 Vicryl for the subcutaneous tissues, and staples for the skin. Sterile gauze dressings were applied and Ms. Centeno was transported to the recovery room in good condition. Sponge and needle count was correct.

## 2022-10-11 NOTE — ANESTHESIA POSTPROCEDURE EVALUATION
Procedure(s):  RIGHT TOTAL HIP ARTHROPLASTY/MEDACTA/2 SA'S. general    Anesthesia Post Evaluation      Multimodal analgesia: multimodal analgesia used between 6 hours prior to anesthesia start to PACU discharge  Patient location during evaluation: bedside  Patient participation: complete - patient participated  Level of consciousness: awake  Pain management: adequate  Airway patency: patent  Anesthetic complications: no  Cardiovascular status: stable  Respiratory status: acceptable  Hydration status: acceptable  Post anesthesia nausea and vomiting:  controlled  Final Post Anesthesia Temperature Assessment:  Normothermia (36.0-37.5 degrees C)      INITIAL Post-op Vital signs:   Vitals Value Taken Time   /54 10/11/22 1753   Temp 37.3 °C (99.2 °F) 10/11/22 1723   Pulse 88 10/11/22 1800   Resp 17 10/11/22 1800   SpO2 89 % 10/11/22 1800   Vitals shown include unvalidated device data.

## 2022-10-11 NOTE — ED TRIAGE NOTES
Pt to ED from home via EMS. Pt. States that she fell at home at lunch yesterday (tripped over something) and was able to get herself back up into a chair. States that she was able to ambulate at home albeit painful. Took motrin. This morning she tried to get up to go to the bathroom and was unable to bear wt. Arrives in the ED A&O with appropr. Respone. Resps even/unlAbored. Skin nwd.  +distal pulses. Right leg shorted than left.  +distal pulse and sensation in right foot. Foot warm and normal color.

## 2022-10-11 NOTE — INTERVAL H&P NOTE
Update History & Physical    The Patient's History and Physical of October 11, 2022 was reviewed with the patient and I examined the patient. There was no change. The surgical site was confirmed by the patient and me. Plan:  The risk, benefits, expected outcome, and alternative to the recommended procedure have been discussed with the patient. Patient understands and wants to proceed with the procedure.     Electronically signed by Lacey Garay MD on 10/11/2022 at 2:07 PM

## 2022-10-11 NOTE — PROGRESS NOTES
Progress Note    Patient: Duy Rader MRN: [id number]  CSN: [id number]    YOB: 1945  Age: 67 y.o. Sex: female    DOA: 10/11/2022 LOS:  LOS: 0 days                    Subjective:     Chief Complaint:   Chief Complaint   Patient presents with    Hip Pain       Pt denied LOC during the fall. Denies hx of mechnical falls. Pt report pain was tolerable with morphine. Denies any cough congestion dysyruia or polyuria. Review of systems  General: No fevers or chills. Cardiovascular: No chest pain or pressure. No palpitations. Pulmonary: No shortness of breath, cough or wheeze. Gastrointestinal: No abdominal pain, nausea, vomiting or diarrhea. Genitourinary: No urinary frequency, urgency, hesitancy or dysuria. Musculoskeletal: Hip pain, no back pain, no recent trauma. Neurologic: No headache, numbness, tingling or weakness. Objective:     Physical Exam:  Visit Vitals  BP (!) 121/58   Pulse 76   Temp 97.8 °F (36.6 °C)   Resp 18   Ht 5' 4\" (1.626 m)   Wt 54.4 kg (120 lb)   SpO2 97%   BMI 20.60 kg/m²        General:         Alert, cooperative, no acute distress    HEENT: NC, Atraumatic. PERRLA, anicteric sclerae. Lungs: CTA Bilaterally. No Wheezing/Rhonchi/Rales. Heart:  Regular  rhythm,  No murmur, No Rubs, No Gallops  Abdomen: Soft, Non distended, Non tender. +Bowel sounds, no HSM  Extremities: R LE extnerally rotated and short compared to left      Intake and Output:  Current Shift:  No intake/output data recorded. Last three shifts:  No intake/output data recorded.     Labs: Results:       Chemistry Recent Labs     10/11/22  0500   *   *   K 3.4*   CL 95*   CO2 25   BUN 10   CREA 0.61   CA 8.6   AGAP 11   BUCR 16   AP 90   TP 6.8   ALB 4.0   GLOB 2.8   AGRAT 1.4      CBC w/Diff Recent Labs     10/11/22  0325   WBC 31.4*   RBC 4.42   HGB 12.7   HCT 37.3      GRANS 37*   LYMPH 62*   EOS 0      Cardiac Enzymes No results for input(s): CPK, CKND1, ÁNGEL in the last 72 hours. No lab exists for component: CKRMB, TROIP   Coagulation Recent Labs     10/11/22  0325   PTP 12.8   INR 0.9   APTT 25.8       Lipid Panel Lab Results   Component Value Date/Time    Cholesterol, total 203 (H) 03/23/2010 09:07 AM    HDL Cholesterol 64 (H) 03/23/2010 09:07 AM    LDL, calculated 124.8 (H) 03/23/2010 09:07 AM    VLDL, calculated 14.2 03/23/2010 09:07 AM    Triglyceride 71 03/23/2010 09:07 AM    CHOL/HDL Ratio 3.2 03/23/2010 09:07 AM      BNP No results for input(s): BNPP in the last 72 hours.    Liver Enzymes Recent Labs     10/11/22  0500   TP 6.8   ALB 4.0   AP 90      Thyroid Studies Lab Results   Component Value Date/Time    TSH 0.77 03/23/2010 09:07 AM          Procedures/imaging: see electronic medical records for all procedures/Xrays and details which were not copied into this note but were reviewed prior to creation of Plan    Medications:   Current Facility-Administered Medications   Medication Dose Route Frequency    famotidine (PEPCID) tablet 20 mg  20 mg Oral BID    sodium chloride (NS) flush 5-40 mL  5-40 mL IntraVENous Q8H    sodium chloride (NS) flush 5-40 mL  5-40 mL IntraVENous PRN    acetaminophen (TYLENOL) tablet 650 mg  650 mg Oral Q6H PRN    Or    acetaminophen (TYLENOL) suppository 650 mg  650 mg Rectal Q6H PRN    polyethylene glycol (MIRALAX) packet 17 g  17 g Oral DAILY PRN    ondansetron (ZOFRAN ODT) tablet 4 mg  4 mg Oral Q8H PRN    Or    ondansetron (ZOFRAN) injection 4 mg  4 mg IntraVENous Q6H PRN    [Held by provider] enoxaparin (LOVENOX) injection 40 mg  40 mg SubCUTAneous DAILY    potassium bicarb-citric acid (EFFER-K) tablet 40 mEq  40 mEq Oral NOW    sodium chloride (NS) flush 5-40 mL  5-40 mL IntraVENous Q8H    morphine injection 2 mg  2 mg IntraVENous Q2H PRN    vancomycin (VANCOCIN) 1,000 mg in 0.9% sodium chloride 250 mL (VIAL-MATE)  1,000 mg IntraVENous ON CALL TO OR    ceFAZolin (ANCEF) 2 g in sterile water (preservative free) 20 mL IV syringe  2 g IntraVENous ON CALL TO OR    tranexamic acid (CYKLOKAPRON) 1,000 mg in 0.9% sodium chloride (MBP/ADV) 110 mL MBP  1 g IntraVENous BID PRN    [COMPLETED] celecoxib (CELEBREX) capsule 200 mg  200 mg Oral DAILY       Assessment/Plan     Principal Problem:    Displaced fracture of right femoral neck (HCC) (10/11/2022)    Active Problems:    Primary osteoarthritis of both hips (10/11/2022)      GERD (gastroesophageal reflux disease) (10/11/2022)    68 yo F admitted for R femoral neck fx 2/2 to mechanical fall. R Femoral Neck fx: Ortho consulted plan for R THR today. Pain management with APAP and morphine. Would consider fragility fx given ground level fall. Pt will need outpt bisphosphante therapy for osteoporosis. Will discuss with d/c planning to establish new primary care. Consulted acute rehab for potential placement. Leukocytosis: no infectious sxs. UA unremarkable. Could be 2/2 to stress of fracture. Will repeat studies tomorrow and continue to trend.        Case discussed with:  [x]Patient  []Family  []Nursing  []Case Management  DVT Prophylaxis:  []Lovenox  []Hep SQ  []SCDs  []Coumadin   []On Heparin gtt    Marianne Barthel, DO  10/11/2022 2:51 PM

## 2022-10-11 NOTE — BRIEF OP NOTE
Brief Postoperative Note    Patient: James Blancas  YOB: 1945  MRN: [id number]    Date of Procedure: 10/11/2022     Pre-Op Diagnosis: Displaced right femoral neck fracture, Osteoarthritis right hip    Post-Op Diagnosis: Same as preoperative diagnosis. Procedure(s):  RIGHT TOTAL HIP ARTHROPLASTY/MEDACTA/2 SA'S    Surgeon(s):  Galilea Pizarro MD    Surgical Assistant: Surg Asst-1: Antwan Goins    Anesthesia: General     Estimated Blood Loss (mL): 874     Complications: None    Specimens: * No specimens in log *     Implants:   Implant Name Type Inv.  Item Serial No.  Lot No. LRB No. Used Action   PLUG ACET MET CTRL H MPACT - [id number]  PLUG ACET MET CTRL H MPACT  [employer] [facility]_ [id number] Right 1 Implanted   SHELL ACET [id number] LNR SZ E 2 H MPACT - [id number]  SHELL ACET [id number] LNR SZ E 2 H MPACT  [employer] [facility]_ [id number] Right 1 Implanted   LINER ACET SZ E ID36MM FLAT UHMWPE HIGHCROSS MPACT - [id number]  LINER ACET SZ E ID36MM FLAT UHMWPE HIGHCROSS MPACT  [employer] [facility]_ [id number] Right 1 Implanted   SCREW BNE L15MM DIA6.5MM CANC FLAT HD DOME MPACT - [id number]  SCREW BNE L15MM DIA6.5MM CANC FLAT HD DOME MPACT  [employer] [facility]_ [id number] Right 1 Implanted   STEM FEM SZ 4 LAT + MASTERLOC - [id number]  STEM FEM SZ 4 LAT + MASTERLOC  [employer] [facility]_ [id number] Right 1 Implanted   12/14 TAPER SIZE LARGE 4MM OFFSET [address] [id number] Right 1 Implanted   HEAD FEM 36 MM BIOLOX OPT MECTACER - [id number]  HEAD FEM 39 MM BIOLOX OPT MECTACER  [employer] [place]_ [id number] Right 1 Implanted       Drains: * No LDAs found *    Findings: above    Electronically Signed by Michael Barry MD on 10/11/2022 at 5:02 PM

## 2022-10-12 ENCOUNTER — HOME HEALTH ADMISSION (OUTPATIENT)
Dept: HOME HEALTH SERVICES | Facility: HOME HEALTH | Age: 77
End: 2022-10-12
Payer: MEDICARE

## 2022-10-12 ENCOUNTER — HOME CARE VISIT (OUTPATIENT)
Dept: HOME HEALTH SERVICES | Facility: HOME HEALTH | Age: 77
End: 2022-10-12

## 2022-10-12 PROBLEM — R33.9 URINE RETENTION: Status: ACTIVE | Noted: 2022-10-12

## 2022-10-12 LAB
BASOPHILS # BLD: 0 K/UL (ref 0–0.1)
BASOPHILS NFR BLD: 0 % (ref 0–2)
DIFFERENTIAL METHOD BLD: ABNORMAL
EOSINOPHIL # BLD: 0 K/UL (ref 0–0.4)
EOSINOPHIL NFR BLD: 0 % (ref 0–5)
ERYTHROCYTE [DISTWIDTH] IN BLOOD BY AUTOMATED COUNT: 13.2 % (ref 11.6–14.5)
HCT VFR BLD AUTO: 31.4 % (ref 35–45)
HGB BLD-MCNC: 10.5 G/DL (ref 12–16)
IMM GRANULOCYTES # BLD AUTO: 0 K/UL
IMM GRANULOCYTES NFR BLD AUTO: 0 %
LYMPHOCYTES # BLD: 12.8 K/UL (ref 0.9–3.6)
LYMPHOCYTES NFR BLD: 58 % (ref 21–52)
MCH RBC QN AUTO: 28.8 PG (ref 24–34)
MCHC RBC AUTO-ENTMCNC: 33.4 G/DL (ref 31–37)
MCV RBC AUTO: 86.3 FL (ref 78–100)
MONOCYTES # BLD: 0.2 K/UL (ref 0.05–1.2)
MONOCYTES NFR BLD: 1 % (ref 3–10)
NEUTS SEG # BLD: 9.1 K/UL (ref 1.8–8)
NEUTS SEG NFR BLD: 41 % (ref 40–73)
NRBC # BLD: 0 K/UL (ref 0–0.01)
NRBC BLD-RTO: 0 PER 100 WBC
PLATELET # BLD AUTO: 220 K/UL (ref 135–420)
PLATELET COMMENTS,PCOM: ABNORMAL
PMV BLD AUTO: 9.9 FL (ref 9.2–11.8)
RBC # BLD AUTO: 3.64 M/UL (ref 4.2–5.3)
RBC MORPH BLD: ABNORMAL
WBC # BLD AUTO: 22.1 K/UL (ref 4.6–13.2)

## 2022-10-12 PROCEDURE — 77030019905 HC CATH URETH INTMIT MDII -A

## 2022-10-12 PROCEDURE — 2709999900 HC NON-CHARGEABLE SUPPLY

## 2022-10-12 PROCEDURE — 36415 COLL VENOUS BLD VENIPUNCTURE: CPT

## 2022-10-12 PROCEDURE — 74011250637 HC RX REV CODE- 250/637: Performed by: INTERNAL MEDICINE

## 2022-10-12 PROCEDURE — 97165 OT EVAL LOW COMPLEX 30 MIN: CPT

## 2022-10-12 PROCEDURE — 85025 COMPLETE CBC W/AUTO DIFF WBC: CPT

## 2022-10-12 PROCEDURE — 74011000250 HC RX REV CODE- 250: Performed by: SPECIALIST

## 2022-10-12 PROCEDURE — 65270000029 HC RM PRIVATE

## 2022-10-12 PROCEDURE — 97535 SELF CARE MNGMENT TRAINING: CPT

## 2022-10-12 PROCEDURE — 74011250636 HC RX REV CODE- 250/636: Performed by: SPECIALIST

## 2022-10-12 PROCEDURE — 74011250637 HC RX REV CODE- 250/637: Performed by: STUDENT IN AN ORGANIZED HEALTH CARE EDUCATION/TRAINING PROGRAM

## 2022-10-12 PROCEDURE — 74011000250 HC RX REV CODE- 250: Performed by: INTERNAL MEDICINE

## 2022-10-12 PROCEDURE — 74011250637 HC RX REV CODE- 250/637: Performed by: SPECIALIST

## 2022-10-12 PROCEDURE — 51798 US URINE CAPACITY MEASURE: CPT

## 2022-10-12 PROCEDURE — 97161 PT EVAL LOW COMPLEX 20 MIN: CPT

## 2022-10-12 PROCEDURE — 97116 GAIT TRAINING THERAPY: CPT

## 2022-10-12 RX ORDER — ASPIRIN 81 MG/1
81 TABLET ORAL DAILY
Qty: 30 TABLET | Refills: 0 | Status: SHIPPED | OUTPATIENT
Start: 2022-10-12

## 2022-10-12 RX ORDER — OXYCODONE HYDROCHLORIDE 5 MG/1
5 TABLET ORAL
Qty: 18 TABLET | Refills: 0 | Status: SHIPPED | OUTPATIENT
Start: 2022-10-12 | End: 2022-10-15

## 2022-10-12 RX ORDER — ONDANSETRON 4 MG/1
4 TABLET, ORALLY DISINTEGRATING ORAL
Qty: 30 TABLET | Refills: 0 | Status: SHIPPED | OUTPATIENT
Start: 2022-10-12

## 2022-10-12 RX ORDER — TAMSULOSIN HYDROCHLORIDE 0.4 MG/1
0.4 CAPSULE ORAL DAILY
Status: DISCONTINUED | OUTPATIENT
Start: 2022-10-12 | End: 2022-10-13 | Stop reason: HOSPADM

## 2022-10-12 RX ADMIN — ACETAMINOPHEN 650 MG: 325 TABLET, FILM COATED ORAL at 18:09

## 2022-10-12 RX ADMIN — ACETAMINOPHEN 650 MG: 325 TABLET, FILM COATED ORAL at 14:54

## 2022-10-12 RX ADMIN — SENNOSIDES AND DOCUSATE SODIUM 1 TABLET: 50; 8.6 TABLET ORAL at 18:09

## 2022-10-12 RX ADMIN — FAMOTIDINE 20 MG: 20 TABLET ORAL at 08:59

## 2022-10-12 RX ADMIN — SENNOSIDES AND DOCUSATE SODIUM 1 TABLET: 50; 8.6 TABLET ORAL at 08:59

## 2022-10-12 RX ADMIN — TAMSULOSIN HYDROCHLORIDE 0.4 MG: 0.4 CAPSULE ORAL at 15:36

## 2022-10-12 RX ADMIN — SODIUM CHLORIDE, PRESERVATIVE FREE 10 ML: 5 INJECTION INTRAVENOUS at 06:36

## 2022-10-12 RX ADMIN — RIVAROXABAN 10 MG: 10 TABLET, FILM COATED ORAL at 08:59

## 2022-10-12 RX ADMIN — ACETAMINOPHEN 650 MG: 325 TABLET, FILM COATED ORAL at 06:32

## 2022-10-12 RX ADMIN — WATER 2 G: 1 INJECTION INTRAMUSCULAR; INTRAVENOUS; SUBCUTANEOUS at 06:33

## 2022-10-12 RX ADMIN — FAMOTIDINE 20 MG: 20 TABLET ORAL at 18:09

## 2022-10-12 NOTE — PROGRESS NOTES
INTERIM UPDATE - 7578 EST on 10/12/2022    Nursing Staff reports that Bladder Scan shows that Patient is retaining >700 mL of Urine. Plan:  Ordered Straight Catheterization and informed Nursing Staff to give in sign out that if Patient requires an additional Straight Catheterization that a Gamble Catheter should be placed (or Physician On-Call should be called and informed of the situation and have Gamble Placement requested).

## 2022-10-12 NOTE — ROUTINE PROCESS
Bedside and Verbal shift change report given to Cate Estrella RN (oncoming nurse) by Maikol Toribio RN (offgoing nurse). Report included the following information SBAR, Kardex, MAR and Recent Results.     SITUATION:  Code Status: Full Code  Reason for Admission: Closed right hip fracture ([facility] [address] [facility] day: 1  Problem List:       Hospital Problems  Date Reviewed: 3/9/2018            Codes Class Noted POA    Urine retention ICD-10-CM: R33.9  ICD-9-CM: 788.20  10/12/2022 No        * (Principal) Displaced fracture of right femoral neck ([facility] [address] ICD-10-CM: S72.001A  ICD-9-CM: 820.8  10/11/2022 Unknown        Primary osteoarthritis of both hips (Chronic) ICD-10-CM: M16.0  ICD-9-CM: 715.15  10/11/2022 Unknown        GERD (gastroesophageal reflux disease) (Chronic) ICD-10-CM: K21.9  ICD-9-CM: 530.81  10/11/2022 Unknown           BACKGROUND:   Past Medical History:   Past Medical History:   Diagnosis Date    Chronic GERD       Patient taking anticoagulants no    Patient has a defibrillator: no    History of shots YES for example, flu, pneumonia, tetanus   Isolation History NO for example, MRSA, CDiff    ASSESSMENT:  Changes in Assessment Throughout Shift: NONE  Significant Changes in 24 hours (for example, RR/code, fall)  Patient has Central Line: no  Patient has Gamble Cath: no   Mobility Issues  PT  IV Patency  OR Checklist  Pending Tests    Last Vitals:  Vitals w/ MEWS Score (last day)       Date/Time MEWS Score Pulse Resp Temp BP Level of Consciousness SpO2    10/12/22 0408 1 86 16 97.6 °F (36.4 °C) 132/70 0 97 %    10/11/22 2327 1 86 17 98.1 °F (36.7 °C) 141/65 0 99 %    10/11/22 2034 1 91 18 97.5 °F (36.4 °C) 145/71 0 100 %    10/11/22 1933 -- 79 12 98.4 °F (36.9 °C) 132/56 -- 100 %    10/11/22 1923 -- 88 13 -- 127/55 -- 100 %    10/11/22 1913 -- 92 18 -- 137/53 -- 100 %    10/11/22 1903 -- 83 15 -- 138/53 -- 100 %    10/11/22 1853 -- 97 19 -- 137/58 -- 99 %    10/11/22 1843 -- 86 15 -- 127/53 -- 93 %    10/11/22 1833 -- 92 17 -- 92/79 -- 90 %    10/11/22 1823 -- 80 11 -- 102/88 -- 97 %    10/11/22 1813 -- 88 17 -- 110/93 -- 94 %    10/11/22 1803 -- 81 9 -- 113/57 -- 93 %    10/11/22 1753 -- 86 16 -- 131/54 -- 92 %    10/11/22 1743 -- 90 14 -- 139/44 -- 98 %    10/11/22 1735 -- 81 9 -- 137/58 -- 100 %    10/11/22 1723 -- 104 18 99.2 °F (37.3 °C) 160/62 -- 100 %    10/11/22 1151 1 76 18 97.8 °F (36.6 °C) 121/58 0 97 %    10/11/22 1000 -- -- -- -- 132/58 -- 98 %    10/11/22 0600 -- -- -- -- 151/69 -- 100 %    10/11/22 0501 -- -- 16 -- 159/75 -- 96 %    10/11/22 0401 -- -- 16 -- 150/77 -- 97 %    10/11/22 03:01:26 1 94 16 97.7 °F (36.5 °C) 195/70 0 99 %          PAIN    Pain Assessment    Pain Intensity 1: 0 (10/12/22 0408)    Pain Location 1: Hip    Pain Intervention(s) 1: Medication (see MAR)    Patient Stated Pain Goal: 0  Intervention effective: N/A  Time of last intervention: N/A Reassessment Completed: yes   Other actions taken for pain: Distraction    Last 3 Weights:  Last 3 Recorded Weights in this Encounter    10/11/22 0301   Weight: 54.4 kg (120 lb)   Weight change:     INTAKE/OUPUT    Current Shift: No intake/output data recorded. Last three shifts: 10/10 1901 - 10/12 0700  In: 1837.5 [I.V.:1837.5]  Out: 1280 [Urine:950; Drains:130]    RECOMMENDATIONS AND DISCHARGE PLANNING  Patient needs and requests: Assistance with ADL's    Pending tests/procedures: labs     Discharge plan for patient: ARU    Discharge planning Needs or Barriers: Placement    Estimated Discharge Date: 10/12/2022 Posted on Whiteboard in Patients Room: yes       \"HEALS\" SAFETY CHECK  A safety check occurred in the patient's room between off going nurse and oncoming nurse listed above.     The safety check included the below items:    H  High Alert Medications Verify all high alert medication drips (heparin, PCA, etc.)  E  Equipment Suction is set up for ALL patients (with yanker)  Red plugs utilized for all equipment (IV pumps, etc.)  WOWs wiped down at end of shift. Room stocked with oxygen, suction, and other unit-specific supplies  A  Alarms Bed alarm is set for fall risk patients  Ensure chair alarm is in place and activated if patient is up in a chair  L  Lines Check IV for any infiltration  Gamble bag is empty if patient has a Gamble   Tubing and IV bags are labeled  S  Safety  Room is clean, patient is clean, and equipment is clean. Hallways are clear from equipment besides carts. Fall bracelet on for fall risk patients  Ensure room is clear and free of clutter  Suction is set up for ALL patients (with lisandro)  Hallways are clear from equipment besides carts.    Isolation precautions followed, supplies available outside room, sign posted    Britt Fountain RN

## 2022-10-12 NOTE — PROGRESS NOTES
OT orders received and chart reviewed. Patient is currently with active complete bedrest orders. Please update patients' activity level when appropriate prior to OT evaluation. Thank you.     Anne Holder MS, OTR/L

## 2022-10-12 NOTE — PROGRESS NOTES
Per Ortho:    Patient seen at sitting up in chair alert and oriented x3. Pain well controlled. Patient status post fall with severe right hip fracture resulting in a right hip total joint prosthesis placement. PT OT ongoing with patient this morning. Patient full weightbearing right lower extremity. Surgical site examined right lower extremity proximal anterior hip intact with a OpSite honeycomb dressing noted. Wound borders appear well approximated underneath the dressing. Surgical staples noted. No surrounding erythema. No evidence of induration fluctuance hematoma or seroma. Patient has full femoral nerve and quad function right lower extremity. Distal sensation intact fully right lower extremity. Plan: Recommend discharge when cleared by medicine Home with home therapy status post right total hip replacement. Patient weightbearing as tolerated. Recommend Xarelto 10 mg p.o. daily for 5 days beginning on 10/13/2022 then discontinuing and beginning 81 mg of aspirin p.o. thereafter. Pain medication oxycodone IR 5 mg 1 p.o. every 4 to 6 hours as needed for pain. [facility] Patient will need a rolling walker and possible shower chair.

## 2022-10-12 NOTE — PROGRESS NOTES
Problem: Pain  Goal: *Control of Pain  Outcome: Progressing Towards Goal     Problem: Patient Education: Go to Patient Education Activity  Goal: Patient/Family Education  Outcome: Progressing Towards Goal     Problem: Falls - Risk of  Goal: *Absence of Falls  Description: Document Teofilo Howe Fall Risk and appropriate interventions in the flowsheet.   Outcome: Progressing Towards Goal  Note: Fall Risk Interventions:  Mobility Interventions: Bed/chair exit alarm, Patient to call before getting OOB, Utilize walker, cane, or other assistive device         Medication Interventions: Bed/chair exit alarm, Patient to call before getting OOB, Teach patient to arise slowly    Elimination Interventions: Bed/chair exit alarm, Call light in reach, Elevated toilet seat, Patient to call for help with toileting needs, Stay With Me (per policy), Toilet paper/wipes in reach, Toileting schedule/hourly rounds, Urinal in reach    History of Falls Interventions: Bed/chair exit alarm, Door open when patient unattended, Room close to nurse's station         Problem: Patient Education: Go to Patient Education Activity  Goal: Patient/Family Education  Outcome: Progressing Towards Goal     Problem: Patient Education: Go to Patient Education Activity  Goal: Patient/Family Education  Outcome: Progressing Towards Goal     Problem: Hip Replacement: Day of Surgery/Unit  Goal: Off Pathway (Use only if patient is Off Pathway)  Outcome: Progressing Towards Goal  Goal: Activity/Safety  Outcome: Progressing Towards Goal  Goal: Consults, if ordered  Outcome: Progressing Towards Goal  Goal: Diagnostic Test/Procedures  Outcome: Progressing Towards Goal  Goal: Nutrition/Diet  Outcome: Progressing Towards Goal  Goal: Medications  Outcome: Progressing Towards Goal  Goal: Respiratory  Outcome: Progressing Towards Goal  Goal: Treatments/Interventions/Procedures  Outcome: Progressing Towards Goal  Goal: Psychosocial  Outcome: Progressing Towards Goal  Goal: *Initiate mobility  Outcome: Progressing Towards Goal  Goal: *Optimal pain control at patient's stated goal  Outcome: Progressing Towards Goal  Goal: *Hemodynamically stable  Outcome: Progressing Towards Goal     Problem: Hip Replacement: Post Op Day 1  Goal: Off Pathway (Use only if patient is Off Pathway)  Outcome: Progressing Towards Goal  Goal: Activity/Safety  Outcome: Progressing Towards Goal  Goal: Diagnostic Test/Procedures  Outcome: Progressing Towards Goal  Goal: Nutrition/Diet  Outcome: Progressing Towards Goal  Goal: Medications  Outcome: Progressing Towards Goal  Goal: Respiratory  Outcome: Progressing Towards Goal  Goal: Treatments/Interventions/Procedures  Outcome: Progressing Towards Goal  Goal: Psychosocial  Outcome: Progressing Towards Goal  Goal: Discharge Planning  Outcome: Progressing Towards Goal  Goal: *Demonstrates progressive activity  Outcome: Progressing Towards Goal  Goal: *Optimal pain control at patient's stated goal  Outcome: Progressing Towards Goal  Goal: *Hemodynamically stable  Outcome: Progressing Towards Goal  Goal: *Discharge plan identified  Outcome: Progressing Towards Goal     Problem: Hip Replacement: Post-Op Day 2  Goal: Off Pathway (Use only if patient is Off Pathway)  Outcome: Progressing Towards Goal  Goal: Activity/Safety  Outcome: Progressing Towards Goal  Goal: Diagnostic Test/Procedures  Outcome: Progressing Towards Goal  Goal: Nutrition/Diet  Outcome: Progressing Towards Goal  Goal: Medications  Outcome: Progressing Towards Goal  Goal: Respiratory  Outcome: Progressing Towards Goal  Goal: Treatments/Interventions/Procedures  Outcome: Progressing Towards Goal  Goal: Psychosocial  Outcome: Progressing Towards Goal  Goal: *Met physical therapy criteria for discharge to the next level of care  Outcome: Progressing Towards Goal  Goal: *Optimal pain control with oral analgesia  Outcome: Progressing Towards Goal  Goal: *Hemodynamically stable  Outcome: Progressing Towards Goal  Goal: *Tolerating diet  Outcome: Progressing Towards Goal  Goal: *Verbalizes understanding of any indicated hip precautions  Outcome: Progressing Towards Goal  Goal: *Patient verbalizes understanding of discharge instructions  Outcome: Progressing Towards Goal     Problem: Patient Education: Go to Patient Education Activity  Goal: Patient/Family Education  Outcome: Progressing Towards Goal

## 2022-10-12 NOTE — PROGRESS NOTES
IV noted to be infiltrated, positional location, IV removed, will notify MD to determine if need new access.

## 2022-10-12 NOTE — PROGRESS NOTES
Patient still not yet voided. Patient bladder scanned, 176ml noted on scan. Will notify MD cooper for further instructions prior to d/c.

## 2022-10-12 NOTE — HOME CARE
Received home health referral for [facility] for SN,PT,OT, Ullin Hip protocol; Discharge order noted for today, patient to discharge pending if patient voids this evening ; spoke to patient,Verified demographics,explained [facility] services and answered all questions; DME: RW ordered and provided by  from [address] [place] [address] ; [facility] referral processed to [facility] central Intake. AR SQUIRES.

## 2022-10-12 NOTE — PROGRESS NOTES
Problem: Mobility Impaired (Adult and Pediatric)  Goal: *Acute Goals and Plan of Care (Insert Text)  Description: Physical Therapy Goals  Initiated 10/12/2022 and to be accomplished within 7 day(s)  1. Patient will move from supine to sit and sit to supine , scoot up and down, and roll side to side in bed with modified independence. 2.  Patient will transfer from bed to chair and chair to bed with modified independence using the least restrictive device. 3.  Patient will perform sit to stand with modified independence. 4.  Patient will ambulate with modified independence for 300 feet with the least restrictive device. 5.  Patient will ascend/descend 7 stairs with unilateral handrail(s) with supervision/set-up. PLOF: Pt reporting she lives in 2 story house with wheelchair lift on outside of home and chair lift for stairs inside. She is independent and lives with . Outcome: Progressing Towards Goal     PHYSICAL THERAPY EVALUATION    Patient: Benjamin Schwarz (11 y.o. female)  Date: 10/12/2022  Primary Diagnosis: Closed right hip fracture (HCC) [S72.001A]  Procedure(s) (LRB):  RIGHT TOTAL HIP ARTHROPLASTY/MEDACTA/2 SA'S (Right) 1 Day Post-Op   Precautions: Total hip, WBAT (RLE)    ASSESSMENT :  Pt cleared to participate in PT session, pt received semi-reclined in recliner and agreeable to therapy session. Based on the objective data described below, the patient presents with decreased endurance, decreased strength, decreased balance reactions, gait deviations, and decreased independence in functional mobility. Patient is 68 yo female admitted to hospital for WILLOW s/p fall and hip fracture. Patient was educated on weight bearing status, hip precautions, and role of therapy. Patient was given demo with instruction on sit <> stand transfer and gait training. Patient transferred to standing with CGA and ambulated x7 feet with CGA to toilet.  Toileting with supervision and able to perform pericare in sitting. Patient demonstrated fair compliance with precautions throughout session. Verbal cues for limiting hip flexion, reaching forward to feet and to toilet paper. Pt then standing from toilet, ambulating back to recliner for rest break. Then ambulating x150 feet with SBA. Declined stair training but does not have to complete stairs at home. At conclusion of session patient transferred to sitting in recliner and was left resting with call bell by the side. Pt positioned for comfort and educated to call for assist before getting up, pt verbalized understanding. Pt left with all needs met and call bell in reach. RN notified of position and participation. Patient will benefit from skilled intervention to address the above impairments. Patient's rehabilitation potential is considered to be Good  Factors which may influence rehabilitation potential include:   [x]         None noted  []         Mental ability/status  []         Medical condition  []         Home/family situation and support systems  []         Safety awareness  []         Pain tolerance/management  []         Other:      PLAN :  Recommendations and Planned Interventions:   [x]           Bed Mobility Training             []    Neuromuscular Re-Education  [x]           Transfer Training                   []    Orthotic/Prosthetic Training  [x]           Gait Training                          []    Modalities  [x]           Therapeutic Exercises           []    Edema Management/Control  [x]           Therapeutic Activities            [x]    Family Training/Education  [x]           Patient Education  []           Other (comment):    Frequency/Duration: Patient will be followed by physical therapy 1-2 times per day/4-7 days per week to address goals.     Further Equipment Recommendations for Discharge: rolling walker and N/A    AMPAC: Current research shows that an AM-PAC score of 18 or greater is associated with a discharge to the patient's home setting. Based on an AM-PAC score of 18/24 and their current functional mobility deficits, it is recommended that the patient have 2-3 sessions per week of Physical Therapy at d/c to increase the patient's independence. This AMPA score should be considered in conjunction with interdisciplinary team recommendations to determine the most appropriate discharge setting. Patient's social support, diagnosis, medical stability, and prior level of function should also be taken into consideration. SUBJECTIVE:   Patient stated My dog is too much.     OBJECTIVE DATA SUMMARY:     Past Medical History:   Diagnosis Date    Chronic GERD    History reviewed. No pertinent surgical history. Barriers to Learning/Limitations: None  Compensate with: N/A  Home Situation:  Home Situation  Home Environment: Private residence  # Steps to Enter: 7 (has wheelchair lift)  One/Two Story Residence: Two story  Lift Chair Available: Yes  Living Alone: No  Support Systems: Spouse/Significant Other, Child(kaushik)  Patient Expects to be Discharged to[de-identified] Home  Current DME Used/Available at Home: None  Critical Behavior:  Neurologic State: Alert  Orientation Level: Oriented X4  Cognition: Follows commands  Safety/Judgement: Awareness of environment; Fall prevention  Psychosocial  Patient Behaviors: Calm; Cooperative    Strength:    Strength: Generally decreased, functional    Tone & Sensation:   Tone: Normal    Sensation: Intact  Range Of Motion:  AROM: Within functional limits (R hip within hip precautions)    Posture:  Posture (WDL): Within defined limits     Functional Mobility:  Bed Mobility:     Supine to Sit:  (found up in chair)     Scooting: Supervision  Transfers:  Sit to Stand: Contact guard assistance  Stand to Sit: Contact guard assistance    Balance:   Sitting: Intact  Standing: Impaired; With support  Standing - Static: Good  Standing - Dynamic : Fair (+)    Ambulation/Gait Training:  Distance (ft): 150 Feet (ft)  Assistive Device: Walker, rolling  Ambulation - Level of Assistance: Contact guard assistance     Gait Description (WDL): Exceptions to WDL  Gait Abnormalities: Decreased step clearance  Right Side Weight Bearing: As tolerated     Base of Support: Center of gravity altered;Shift to left     Speed/Lazara: Slow;Shuffled  Step Length: Right shortened;Left shortened       Pain:  Pain level pre-treatment: 0/10   Pain level post-treatment: 0/10     Activity Tolerance:   Good     Please refer to the flowsheet for vital signs taken during this treatment. After treatment:   [x]         Patient left in no apparent distress sitting up in chair  []         Patient left in no apparent distress in bed  [x]         Call bell left within reach  [x]         Nursing notified  []         Caregiver present  []         Bed alarm activated  []         SCDs applied    COMMUNICATION/EDUCATION:   [x]         Role of Physical Therapy in the acute care setting. [x]         Fall prevention education was provided and the patient/caregiver indicated understanding. [x]         Patient/family have participated as able in goal setting and plan of care. [x]         Patient/family agree to work toward stated goals and plan of care. []         Patient understands intent and goals of therapy, but is neutral about his/her participation. []         Patient is unable to participate in goal setting/plan of care: ongoing with therapy staff.  []         Other:     Thank you for this referral.  Robby Herman, PT   Time Calculation: 26 mins      Eval Complexity: History: MEDIUM  Complexity : 1-2 comorbidities / personal factors will impact the outcome/ POC Exam:LOW Complexity : 1-2 Standardized tests and measures addressing body structure, function, activity limitation and / or participation in recreation  Presentation: LOW Complexity : Stable, uncomplicated  Clinical Decision Making:Low Complexity low  Overall Complexity:LOW     [address] \[facility] [place] [facility]\"" [address] [address] AM-PAC® Basic Mobility Inpatient Short Form (6-Clicks) Version 2    How much HELP from another person does the patient currently need    (If the patient hasn't done an activity recently, how much help from another person do you think he/she would need if he/she tried?)   Total (Total A or Dep)   A Lot  (Mod to Max A)   A Little (Sup or Min A)   None (Mod I to I)   Turning from your back to your side while in a flat bed without using bedrails? [] 1 [] 2 [x] 3 [] 4   2. Moving from lying on your back to sitting on the side of a flat bed without using bedrails? [] 1 [] 2 [x] 3 [] 4   3. Moving to and from a bed to a chair (including a wheelchair)? [] 1 [] 2 [x] 3 [] 4   4. Standing up from a chair using your arms (e.g., wheelchair, or bedside chair)? [] 1 [] 2 [x] 3 [] 4   5. Walking in hospital room? [] 1 [] 2 [x] 3 [] 4   6. Climbing 3-5 steps with a railing?+   [] 1 [] 2 [x] 3 [] 4   +If stair climbing cannot be assessed, skip item #6. Sum responses from items 1-5.

## 2022-10-12 NOTE — PROGRESS NOTES
Discharge planning    Discharge order noted for today. Pt has been accepted to [facility] agency. Met with patient and  agreeable to the transition plan today. Transport has been arranged with spouse. Patient's discharge summary and home health  orders have been forwarded to [facility] home health  agency via [employer]. Updated bedside RN, Ricki,  to the transition plan. Discharge information has been documented on the AVS.   April, unit secretary, will make appointment for new PCP. Patient signed for DME for rolling walker and received at bedside. Reason for Admission:  Closed right hip fracture ([facility] [address] [S72.001A]                 RUR Score:    11%            Plan for utilizing home health:    yes                      Likelihood of Readmission:   LOW                         Transition of Care Plan:              Initial assessment completed with patient. Cognitive status of patient: oriented to time, place, person and situation. Face sheet information confirmed:  yes. The patient designates Carlos Gerber ([phone]) to participate in her discharge plan and to receive any needed information. This patient lives in a single family home with spouse. Patient was able to navigate steps as needed. Prior to hospitalization, patient was considered to be independent with ADLs/IADLS : yes . Patient has a current ACP document on file: no    The patient and spouse will be available to transport patient home upon discharge. The patient reported no medical equipment available in the home. Patient is not currently active with home health. Patient has not stayed in a skilled nursing facility or rehab. This patient is on dialysis :no      List of available Home Health agencies were provided and reviewed with the patient prior to discharge. Freedom of choice signed: yes, for any local home health agency    Currently, the discharge plan is Home with [facility].     The patient states that she can obtain her medications from the pharmacy, and take her medications as directed. Patient's current insurance is Medicare Part A &B       Care Management Interventions  PCP Verified by CM:  Yes  Mode of Transport at Discharge: Self  Transition of Care Consult (CM Consult): Discharge Planning  Discharge Durable Medical Equipment: No  Physical Therapy Consult: Yes  Occupational Therapy Consult: Yes  Speech Therapy Consult: No  Support Systems: Spouse/Significant Other  Confirm Follow Up Transport: Self  The Plan for Transition of Care is Related to the Following Treatment Goals : home health  The Patient and/or Patient Representative was Provided with a Choice of Provider and Agrees with the Discharge Plan?: Yes  Freedom of Choice List was Provided with Basic Dialogue that Supports the Patient's Individualized Plan of Care/Goals, Treatment Preferences and Shares the Quality Data Associated with the Providers?: Yes  Discharge Location  Patient Expects to be Discharged to[de-identified] Home with home health        HUSSEIN Ramon, RN  Pager # [phone]  Care Manager

## 2022-10-12 NOTE — PROGRESS NOTES
OCCUPATIONAL THERAPY EVALUATION/DISCHARGE    Patient: Jose Sanches (59 y.o. female)  Date: 10/12/2022  Primary Diagnosis: Closed right hip fracture (HCC) [S72.001A]  Procedure(s) (LRB):  RIGHT TOTAL HIP ARTHROPLASTY/MEDACTA/2 SA'S (Right) 1 Day Post-Op   Precautions: Total hip, WBAT (RLE)  PLOF: Pt is Mod Ind for ADLs and functional mobility w/o AD. Pt lives with spouse and has 3 y.o. dog. ASSESSMENT AND RECOMMENDATIONS:  Based on the objective data described below, the patient presents with hip precautions limiting her independence with ADLs. Pt is up in the recliner upon entry, motivated to participate in OT. Patient educated on the role of OT, hip precautions and how they relate to ADLs and functional mobility. Pt verbalized understanding and was able to return education. Pt educated on AE for LB ADLs and issued reacher, sock aid, and long handled sponge (pt has long handled shoe horn). Following education pt was able to perform LB dressing and bathing with Supervision. Pt maneuvered to the BR with FWW with Supervision, attempted to void, unsuccessful. Pt educated on DME for BR safety. Pt reports she has a built in shower chair to increase independence with bathing. Pt has spouse at home to assist as needed for ADls and provide Supervision for safety as well as will be caring for the dog while pt is recovering. Further skilled occupational therapy in acute care is not indicated at this time. Pt may benefit from [address] [place] [address] to ensure safety with I/ADLs in home environment. Further Equipment Recommendations for Discharge: bedside commode or raised toilet seat    AMPA: Current research shows that an AM-PAC score of 18 or greater is associated with a discharge to the patient's home setting. Based on an AM-PAC score of 21/24 and their current ADL deficits; it is recommended that the patient have 2-3 sessions per week of Occupational Therapy at d/c to increase the patient's independence.       This [facility] score should be considered in conjunction with interdisciplinary team recommendations to determine the most appropriate discharge setting. Patient's social support, diagnosis, medical stability, and prior level of function should also be taken into consideration. SUBJECTIVE:   Patient stated I feel great.     OBJECTIVE DATA SUMMARY:     Past Medical History:   Diagnosis Date    Chronic GERD    History reviewed. No pertinent surgical history. Barriers to Learning/Limitations: None  Compensate with: visual, verbal, tactile, kinesthetic cues/model    Home Situation:   Home Situation  Home Environment: Private residence  # Steps to Enter: 7 (has wheelchair lift)  One/Two Story Residence: Two story  Lift Chair Available: Yes  Living Alone: No  Support Systems: Spouse/Significant Other  Patient Expects to be Discharged to[de-identified] Home with home health  Current DME Used/Available at Home: Grab bars, Shower chair  Tub or Shower Type: Shower  [x]     Right hand dominant   []     Left hand dominant    Cognitive/Behavioral Status:  Neurologic State: Alert  Orientation Level: Oriented X4  Cognition: Follows commands  Safety/Judgement: Awareness of environment; Fall prevention    Skin: visible skin intact  Edema: none noted    Vision/Perceptual:            Corrective Lenses: Glasses    Coordination: BUE  Coordination: Generally decreased, functional  Fine Motor Skills-Upper: Left Intact; Right Intact    Gross Motor Skills-Upper: Left Intact; Right Intact    Balance:  Sitting: Intact  Standing: Impaired; With support  Standing - Static: Good  Standing - Dynamic : Fair (+)    Strength: BUE    Strength:  Within functional limits   Tone & Sensation: BUE    Tone: Normal  Sensation: Intact   Range of Motion: BUE    AROM: Within functional limits     Functional Mobility and Transfers for ADLs:  Bed Mobility:     Supine to Sit:  (found up in chair)     Scooting: Supervision  Transfers:  Sit to Stand: Supervision  Stand to Sit: Stand-by assistance   Toilet Transfer : Supervision;Stand-by assistance      ADL Assessment:  Feeding: Modified independent    Oral Facial Hygiene/Grooming: Modified Independent    Bathing: Supervision; Adaptive equipment    Upper Body Dressing: Modified independent    Lower Body Dressing: Supervision; Additional time; Adaptive equipment    Toileting: Supervision   ADL Intervention:     Cognitive Retraining  Safety/Judgement: Awareness of environment; Fall prevention  Pain:  Pain level pre-treatment: 0/10   Pain level post-treatment: 0/10     Activity Tolerance:   Good  Please refer to the flowsheet for vital signs taken during this treatment. After treatment:   [x]  Patient left in no apparent distress sitting up in chair  []  Patient left in no apparent distress in bed  [x]  Call bell left within reach  [x]  Nursing notified  []  Caregiver present  []  Bed alarm activated    COMMUNICATION/EDUCATION:   [x]      Role of Occupational Therapy in the acute care setting  [x]      Home safety education was provided and the patient/caregiver indicated understanding. []      Patient/family have participated as able and agree with findings and recommendations. []      Patient is unable to participate in plan of care at this time. Thank you for this referral.  Dru Blood OTR/L  Time Calculation: 26 mins      Eval Complexity: History: LOW Complexity : Brief history review ; Examination: LOW Complexity : 1-3 performance deficits relating to physical, cognitive , or psychosocial skils that result in activity limitations and / or participation restrictions ;    Decision Making:LOW Complexity : No comorbidities that affect functional and no verbal or physical assistance needed to complete eval tasks     [address] [place] [address] [address] [address] AM-PAC® Daily Activity Inpatient Short Form (6-Clicks)*    How much HELP from another person does the patient currently need    (If the patient hasn't done an activity recently, how much help from another person do you think he/she would need if he/she tried?)   Total (Total A or Dep)   A Lot  (Mod to Max A)   A Little (Sup or Min A)   None (Mod I to I)   Putting on and taking off regular lower body clothing? [] 1 [] 2 [x] 3 [] 4   2. Bathing (including washing, rinsing,      drying)? [] 1 [] 2 [x] 3 [] 4   3. Toileting, which includes using toilet, bedpan or urinal?   [] 1 [] 2 [x] 3 [] 4   4. Putting on and taking off regular upper body clothing? [] 1 [] 2 [] 3 [x] 4   5. Taking care of personal grooming such as brushing teeth? [] 1 [] 2 [] 3 [x] 4   6. Eating meals? [] 1 [] 2 [] 3 [x] 4       Current research shows that an AM-PAC score of 18 or greater is associated with a discharge to the patient's home setting. Based on an AM-PAC score of 21/24 and their current ADL deficits; it is recommended that the patient have 2-3 sessions per week of Occupational Therapy at d/c to increase the patient's independence.

## 2022-10-12 NOTE — PROGRESS NOTES
Physician Progress Note      PATIENT:               Jessenia Champion  CSN #:                  [id number]  :                       1945  ADMIT DATE:       10/11/2022 2:47 AM  DISCH DATE:  RESPONDING  PROVIDER #:        Eula FREEDMAN DO          QUERY TEXT:    Pt admitted with right femoral neck Fracture. Pt noted to have Primary osteoarthritis of both hips. If possible, please document in progress notes and discharge summary if you are evaluating and/or treating any of the following: The medical record reflects the following:  Risk Factors: Primary osteoarthritis of both hips  Clinical Indicators: Displaced fracture of right femoral neck  ,had ground level fall  Treatment: s/p rt total hip arthroplasty    Thank you  Nadja Potts RN CRCR CDI , [email or web address]  Options provided:  -- Pathological right femoral neck fracture  -- Osteoporotic right femoral neck Fracture  -- Osteoporotic right femoral neck fracture following fall which would not usually break a normal, healthy bone  -- Traumatic right femoral neck  fracture  -- Other - I will add my own diagnosis  -- Disagree - Not applicable / Not valid  -- Disagree - Clinically unable to determine / Unknown  -- Refer to Clinical Documentation Reviewer    PROVIDER RESPONSE TEXT:    This patient has a traumatic right femoral neck fracture. Query created by:  Vasquez Jack on 10/11/2022 5:32 PM      Electronically signed by:  Anselmo Crews DO 10/12/2022 2:31 PM

## 2022-10-12 NOTE — DISCHARGE SUMMARY
Discharge Summary    Patient: Anisa Isaac MRN: [id number]  CSN: [id number]    YOB: 1945  Age: 67 y.o. Sex: female    DOA: 10/11/2022 LOS:  LOS: 1 day        Disposition: Home with Tonja Holland    Discharge Date: 10/12/2022    Admission Diagnosis: Closed right hip fracture ([address] [S72.001A]    Discharge Diagnosis:    Displaced right femoral neck fracture   2. Osteoarthritis right hip      Discharge Condition: Stable      PHYSICAL EXAM  Visit Vitals  /68   Pulse 85   Temp 98.7 °F (37.1 °C)   Resp 20   Ht 5' 4\" (1.626 m)   Wt 54.4 kg (120 lb)   SpO2 100%   Breastfeeding No   BMI 20.60 kg/m²       General: Alert, cooperative, no acute distress    HEENT: PERRLA, EOMI. Anicteric sclerae. Lungs:  CTA Bilaterally. No Wheezing/Rales. Heart:             Regular rate and Rhythm. Abdomen: Soft, Non distended, Non tender. + Bowel sounds. Extremities: No edema. Psych:   Good insight. Not anxious or agitated. Neurologic:  AA, oriented X 3. Moves all ext                                 Hospital Course:     67year old female admitted for right hip fracture after a mechanical fall at home. Orthopaedic surgery was consulted and performed a successful right total hip arthroplasty. Patient was weight bearing after the procedure. Pain was tolerable. She will go home with oxycodone 5mg IR as needed for pain control. She should also follow up with a primary care physician as this could be consider a fragility fracture and will need treatment for osteoporosis. Procedures:   RIGHT TOTAL HIP ARTHROPLASTY/MEDACTA/2 SA'S    Consults:   Orthopaedic surgery    Imaging studies:   Study Result    Narrative & Impression   XR HIP RT W OR WO PELV  1 VW: 10/11/2022 4:45 AM     CLINICAL INFORMATION  fall. COMPARISON  None. TECHNIQUE  2 views of the right     FINDINGS:  Mildly displaced right femoral neck fracture without significant angulation. Mild hip osteoarthritis. No additional fractures are identified. IMPRESSION  Displaced right femoral neck fracture       XR Results (most recent):  Results from Hospital Encounter encounter on 10/11/22    XR CHEST SNGL V    Narrative  INDICATION: Post fall, hip fracture    TECHNIQUE: Portable chest radiograph    COMPARISON: 7 March 2018    FINDINGS: The lungs are adequately inflated. No focal consolidation, effusion or  pneumothorax. Heart size within normal limits. No acute osseous abnormality. Impression  No acute cardiopulmonary findings. Discharge Medications:     Current Discharge Medication List        START taking these medications    Details   oxyCODONE IR (ROXICODONE) 5 mg immediate release tablet Take 1 Tablet by mouth every four (4) hours as needed for Pain for up to 3 days. Max Daily Amount: 30 mg.  Qty: 18 Tablet, Refills: 0    Associated Diagnoses: Closed fracture of right hip, initial encounter ([facility])      rivaroxaban (XARELTO) 10 mg tablet Take 1 tab by mouth daily with food for 5 days then start daily aspirin 81mg  Indications: deep vein thrombosis prevention  Qty: 5 Tablet, Refills: 0      aspirin delayed-release 81 mg tablet Take 1 Tablet by mouth daily. Qty: 30 Tablet, Refills: 0           CONTINUE these medications which have CHANGED    Details   ondansetron (ZOFRAN ODT) 4 mg disintegrating tablet Take 1 Tablet by mouth every eight (8) hours as needed for Nausea or Vomiting. Qty: 30 Tablet, Refills: 0           STOP taking these medications       famotidine (PEPCID) 20 mg tablet Comments:   Reason for Stopping:         metoprolol tartrate (LOPRESSOR) 50 mg tablet Comments:   Reason for Stopping:         cloNIDine HCl (CATAPRES) 0.1 mg tablet Comments:   Reason for Stopping:         HYDROcodone-acetaminophen (NORCO) 7.5-325 mg per tablet Comments:   Reason for Stopping: It is important that you take the medication exactly as they are prescribed.    Keep your medication in the bottles provided by the pharmacist and keep a list of the medication names, dosages, and times to be taken in your wallet. Do not take other medications without consulting your doctor. Minutes spent on discharge: 40 minutes spent coordinating this discharge (review instructions/follow-up, prescriptions, preparing report for sign off)    Benjie Hensley DO  10/12/2022 2:01 PM    Disclaimer: Sections of this note are dictated using utilizing voice recognition software. Minor typographical errors may be present. If questions arise, please do not hesitate to contact me or call our department.

## 2022-10-13 VITALS
DIASTOLIC BLOOD PRESSURE: 70 MMHG | WEIGHT: 120 LBS | HEART RATE: 78 BPM | OXYGEN SATURATION: 99 % | SYSTOLIC BLOOD PRESSURE: 151 MMHG | BODY MASS INDEX: 20.49 KG/M2 | TEMPERATURE: 97.8 F | HEIGHT: 64 IN | RESPIRATION RATE: 12 BRPM

## 2022-10-13 LAB
BASOPHILS # BLD: 0 K/UL (ref 0–0.1)
BASOPHILS NFR BLD: 0 % (ref 0–2)
DIFFERENTIAL METHOD BLD: ABNORMAL
EOSINOPHIL # BLD: 0.5 K/UL (ref 0–0.4)
EOSINOPHIL NFR BLD: 2 % (ref 0–5)
ERYTHROCYTE [DISTWIDTH] IN BLOOD BY AUTOMATED COUNT: 13.2 % (ref 11.6–14.5)
HCT VFR BLD AUTO: 26.4 % (ref 35–45)
HGB BLD-MCNC: 9 G/DL (ref 12–16)
IMM GRANULOCYTES # BLD AUTO: 0 K/UL
IMM GRANULOCYTES NFR BLD AUTO: 0 %
LYMPHOCYTES # BLD: 15.1 K/UL (ref 0.9–3.6)
LYMPHOCYTES NFR BLD: 59 % (ref 21–52)
MCH RBC QN AUTO: 29.4 PG (ref 24–34)
MCHC RBC AUTO-ENTMCNC: 34.1 G/DL (ref 31–37)
MCV RBC AUTO: 86.3 FL (ref 78–100)
MONOCYTES # BLD: 0.8 K/UL (ref 0.05–1.2)
MONOCYTES NFR BLD: 3 % (ref 3–10)
NEUTS SEG # BLD: 9.3 K/UL (ref 1.8–8)
NEUTS SEG NFR BLD: 36 % (ref 40–73)
NRBC # BLD: 0 K/UL (ref 0–0.01)
NRBC BLD-RTO: 0 PER 100 WBC
PLATELET # BLD AUTO: 222 K/UL (ref 135–420)
PLATELET COMMENTS,PCOM: ABNORMAL
PMV BLD AUTO: 10.2 FL (ref 9.2–11.8)
RBC # BLD AUTO: 3.06 M/UL (ref 4.2–5.3)
RBC MORPH BLD: ABNORMAL
WBC # BLD AUTO: 25.7 K/UL (ref 4.6–13.2)

## 2022-10-13 PROCEDURE — 36415 COLL VENOUS BLD VENIPUNCTURE: CPT

## 2022-10-13 PROCEDURE — 74011250637 HC RX REV CODE- 250/637: Performed by: SPECIALIST

## 2022-10-13 PROCEDURE — 85025 COMPLETE CBC W/AUTO DIFF WBC: CPT

## 2022-10-13 PROCEDURE — 99233 SBSQ HOSP IP/OBS HIGH 50: CPT | Performed by: STUDENT IN AN ORGANIZED HEALTH CARE EDUCATION/TRAINING PROGRAM

## 2022-10-13 PROCEDURE — 74011250637 HC RX REV CODE- 250/637: Performed by: INTERNAL MEDICINE

## 2022-10-13 PROCEDURE — 97116 GAIT TRAINING THERAPY: CPT

## 2022-10-13 PROCEDURE — 99239 HOSP IP/OBS DSCHRG MGMT >30: CPT | Performed by: INTERNAL MEDICINE

## 2022-10-13 PROCEDURE — 74011250637 HC RX REV CODE- 250/637: Performed by: STUDENT IN AN ORGANIZED HEALTH CARE EDUCATION/TRAINING PROGRAM

## 2022-10-13 PROCEDURE — 74011000250 HC RX REV CODE- 250: Performed by: INTERNAL MEDICINE

## 2022-10-13 RX ORDER — TAMSULOSIN HYDROCHLORIDE 0.4 MG/1
0.4 CAPSULE ORAL DAILY
Qty: 15 CAPSULE | Refills: 0 | Status: SHIPPED | OUTPATIENT
Start: 2022-10-14

## 2022-10-13 RX ADMIN — FAMOTIDINE 20 MG: 20 TABLET ORAL at 09:28

## 2022-10-13 RX ADMIN — OXYCODONE HYDROCHLORIDE 10 MG: 10 TABLET ORAL at 09:28

## 2022-10-13 RX ADMIN — ACETAMINOPHEN 650 MG: 325 TABLET, FILM COATED ORAL at 06:55

## 2022-10-13 RX ADMIN — TAMSULOSIN HYDROCHLORIDE 0.4 MG: 0.4 CAPSULE ORAL at 09:28

## 2022-10-13 RX ADMIN — SENNOSIDES AND DOCUSATE SODIUM 1 TABLET: 50; 8.6 TABLET ORAL at 09:30

## 2022-10-13 RX ADMIN — OXYCODONE HYDROCHLORIDE 10 MG: 10 TABLET ORAL at 00:58

## 2022-10-13 RX ADMIN — ACETAMINOPHEN 650 MG: 325 TABLET, FILM COATED ORAL at 00:59

## 2022-10-13 RX ADMIN — RIVAROXABAN 10 MG: 10 TABLET, FILM COATED ORAL at 09:28

## 2022-10-13 NOTE — DISCHARGE INSTRUCTIONS
DISCHARGE SUMMARY from [address]:    After general anesthesia or intravenous sedation, for 24 hours or while taking prescription Narcotics:  Limit your activities  Do not drive and operate hazardous machinery  Do not make important personal or business decisions  Do  not drink alcoholic beverages  If you have not urinated within 8 hours after discharge, please contact your surgeon on call. Report the following to your surgeon:  Excessive pain, swelling, redness or odor of or around the surgical area  Temperature over 100.5  Nausea and vomiting lasting longer than 4 hours or if unable to take medications  Any signs of decreased circulation or nerve impairment to extremity: change in color, persistent  numbness, tingling, coldness or increase pain  Any questions    What to do at Home:  Recommended activity: Activity as tolerated. If you experience any of the following symptoms Chest pains, shortness of breathe or abnormal breathing, please follow up with the nearest ED. *  Please give a list of your current medications to your Primary Care Provider. *  Please update this list whenever your medications are discontinued, doses are      changed, or new medications (including over-the-counter products) are added. *  Please carry medication information at all times in case of emergency situations. These are general instructions for a healthy lifestyle:    No smoking/ No tobacco products/ Avoid exposure to second hand smoke  Surgeon General's Warning:  Quitting smoking now greatly reduces serious risk to your health.     Obesity, smoking, and sedentary lifestyle greatly increases your risk for illness    A healthy diet, regular physical exercise & weight monitoring are important for maintaining a healthy lifestyle    You may be retaining fluid if you have a history of heart failure or if you experience any of the following symptoms:  Weight gain of 3 pounds or more overnight or 5 pounds in a week, increased swelling in our hands or feet or shortness of breath while lying flat in bed. Please call your doctor as soon as you notice any of these symptoms; do not wait until your next office visit. The discharge information has been reviewed with the patient. The patient verbalized understanding. Discharge medications reviewed with the patient and appropriate educational materials and side effects teaching were provided.   ___________________________________________________________________________________________________________________________________

## 2022-10-13 NOTE — PROGRESS NOTES
Pt called stating [facility] \" needs the Pain meds e-prescribed to the pharmacy\"    Pharmacy agrees they have the hard copy but unable to fill medication without the med being  Prescribed.

## 2022-10-13 NOTE — DISCHARGE SUMMARY
Physician Discharge Summary       Patient: James Blancas MRN: [id number]  SSN: [id number]    YOB: 1945  Age: 67 y.o. Sex: female    PCP: None    Allergies: Patient has no known allergies. Admit date: 10/11/2022  Admitting Provider: Henrique Quintana DO    Discharge date: 10/13/2022  Discharging Provider: Donnie Anderson MD    * Admission Diagnoses: Closed right hip fracture [facility]) [S72.001A]    * Discharge Diagnoses:    Hospital Problems as of 10/13/2022 Date Reviewed: 3/9/2018            Codes Class Noted - Resolved POA    Urine retention ICD-10-CM: R33.9  ICD-9-CM: 788.20  10/12/2022 - Present No        * (Principal) Displaced fracture of right femoral neck (HCC) ICD-10-CM: S72.001A  ICD-9-CM: 820.8  10/11/2022 - Present Unknown        Primary osteoarthritis of both hips (Chronic) ICD-10-CM: M16.0  ICD-9-CM: 715.15  10/11/2022 - Present Unknown        GERD (gastroesophageal reflux disease) (Chronic) ICD-10-CM: K21.9  ICD-9-CM: 530.81  10/11/2022 - Present Unknown         1. Displaced right femoral neck fracture  2. Osteoarthritis of right hip  3. Chronic leukocytosis  4. Urinary retention, currently resolved      [facility] Course: 67year old female admitted for right hip fracture after a mechanical fall at home. Orthopaedic surgery was consulted and performed a successful right total hip arthroplasty. Patient was weight bearing after the procedure. Pain was tolerable. She will go home with oxycodone 5mg IR as needed for pain control. She should also follow up with a primary care physician as this could be consider a fragility fracture and will need treatment for osteoporosis. Initial plan was to discharge patient home on October 12, 2022 but it was canceled as patient had some urine retention that got better once patient was started on Flomax. She was voiding urine without any issues. Her pain was managed with current pain management.   Patient was also found out to chronic leukocytosis and I have discussed the case with oncologist/hematologist Dr. Papa Sofia who will be following this patient after 4 weeks from now. Patient is aware of it. * Procedures:   Procedure(s):  RIGHT TOTAL HIP ARTHROPLASTY/MEDACTA/2 SA'S      Consults: Orthopedic Surgery    Significant Diagnostic Studies: X-ray right hip and chest x-ray    Discharge Exam:  Please refer to my progress note from October 13, 2022 for further detail    * Discharge Condition: improved  * Disposition: Home    Discharge Medications:  Current Discharge Medication List        START taking these medications    Details   tamsulosin (FLOMAX) 0.4 mg capsule Take 1 Capsule by mouth daily. Qty: 15 Capsule, Refills: 0  Start date: 10/14/2022      oxyCODONE IR (ROXICODONE) 5 mg immediate release tablet Take 1 Tablet by mouth every four (4) hours as needed for Pain for up to 3 days. Max Daily Amount: 30 mg.  Qty: 18 Tablet, Refills: 0  Start date: 10/12/2022, End date: 10/15/2022    Associated Diagnoses: Closed fracture of right hip, initial encounter ([facility])      rivaroxaban (XARELTO) 10 mg tablet Take 1 tab by mouth daily with food for 5 days then start daily aspirin 81mg  Indications: deep vein thrombosis prevention  Qty: 5 Tablet, Refills: 0  Start date: 10/12/2022      aspirin delayed-release 81 mg tablet Take 1 Tablet by mouth daily. Qty: 30 Tablet, Refills: 0  Start date: 10/12/2022           CONTINUE these medications which have CHANGED    Details   ondansetron (ZOFRAN ODT) 4 mg disintegrating tablet Take 1 Tablet by mouth every eight (8) hours as needed for Nausea or Vomiting.   Qty: 30 Tablet, Refills: 0  Start date: 10/12/2022           STOP taking these medications       famotidine (PEPCID) 20 mg tablet Comments:   Reason for Stopping:         metoprolol tartrate (LOPRESSOR) 50 mg tablet Comments:   Reason for Stopping:         cloNIDine HCl (CATAPRES) 0.1 mg tablet Comments:   Reason for Stopping:         HYDROcodone-acetaminophen (NORCO) 7.5-325 mg per tablet Comments:   Reason for Stopping:               * Follow-up Care/Patient Instructions: Activity: PT/OT per Home Health  Diet: Regular Diet  Wound Care: As directed    Follow-up Information       Follow up With Specialties Details Why Contact Jose Noel NP Nurse Practitioner Go on 10/31/2022 @ 2:30pm [phone] [id number]  [phone]      None    None ([phone]) Patient stated that they have no PCP            Follow-up Appointments   Procedures    FOLLOW UP VISIT Appointment in: Other (Specify) With PCP in 5 days With Dr. Glynn Schwarz office Lynn OLIVAREZ [facility] as scheduled on 10/21/2022. With Dr. Opal Hdez in 4 weeks as a new patient for leukocytosis management     With PCP in 5 days  With Dr. Glynn Schwarz office Lynn OLIVAREZ [facility] as scheduled on 10/21/2022. With Dr. Opal Hdez in 4 weeks as a new patient for leukocytosis management     Standing Status:   Standing     Number of Occurrences:   1     Order Specific Question:   Appointment in     Answer: Other (Specify)       Time of discharge greater than 35 minutes    Disclaimer: Sections of this note are dictated using utilizing voice recognition software, which may have resulted in some phonetic based errors in grammar and contents. Even though attempts were made to correct all the mistakes, some may have been missed, and remained in the body of the document. If questions arise, please contact our department.     Signed:  [place] MD  10/13/2022  10:55 AM

## 2022-10-13 NOTE — HOME CARE
Discharge order noted for today. Initiated referral has been updated with current information. Notified central intake and scheduling of changes.       ---  Elder Tomas LPN  [facility] - INPATIENT Liaison

## 2022-10-13 NOTE — PROGRESS NOTES
Bedside and Verbal shift change report given to Jordan Vitale RN   (oncoming nurse) by Sherri Pizarro    (offgoing nurse). Report included the following information SBAR, Intake/Output, MAR, and Med Rec Status.

## 2022-10-13 NOTE — PROGRESS NOTES
Discharge planning    Discharge was cancelled yesterday by MD.  Discharge order noted for today. Pt has been accepted to [facility] agency. Patient and  agreeable to the transition plan today. Transport has been arranged with spouse. Patient's discharge summary and home health  orders have been forwarded to [facility] home health  agency via [employer]. Updated bedside RN, [address]  to the transition plan.   Discharge information has been documented on the AVS.     HUSSEIN Hamilton, RN  Pager # [phone]  Care Manager

## 2022-10-13 NOTE — PROGRESS NOTES
Hospitalist Progress Note    Subjective:   Daily Progress Note: 10/13/2022     Patient stated she walked in the hallway without any new symptoms. Off and on right leg pain present. No tingling or numbness. No chest pain or shortness of breath. No cough. No nausea vomiting. She is voiding urine without any problem. No fever or chills. I showed her that she has had chronic leukocytosis based on records but she has never seen any hematologist oncologist.  Denies any hematemesis or epistaxis.     Current Facility-Administered Medications   Medication Dose Route Frequency    rivaroxaban (XARELTO) tablet 10 mg  10 mg Oral DAILY    tamsulosin (FLOMAX) capsule 0.4 mg  0.4 mg Oral DAILY    famotidine (PEPCID) tablet 20 mg  20 mg Oral BID    sodium chloride (NS) flush 5-40 mL  5-40 mL IntraVENous Q8H    sodium chloride (NS) flush 5-40 mL  5-40 mL IntraVENous PRN    acetaminophen (TYLENOL) tablet 650 mg  650 mg Oral Q6H PRN    Or    acetaminophen (TYLENOL) suppository 650 mg  650 mg Rectal Q6H PRN    polyethylene glycol (MIRALAX) packet 17 g  17 g Oral DAILY PRN    ondansetron (ZOFRAN ODT) tablet 4 mg  4 mg Oral Q8H PRN    [Held by provider] enoxaparin (LOVENOX) injection 40 mg  40 mg SubCUTAneous DAILY    morphine injection 2 mg  2 mg IntraVENous Q2H PRN    tranexamic acid (CYKLOKAPRON) 1,000 mg in 0.9% sodium chloride (MBP/ADV) 110 mL MBP  1 g IntraVENous BID PRN    sodium chloride (NS) flush 5-40 mL  5-40 mL IntraVENous PRN    acetaminophen (TYLENOL) tablet 650 mg  650 mg Oral Q6H    oxyCODONE IR (ROXICODONE) tablet 5 mg  5 mg Oral Q4H PRN    oxyCODONE IR (ROXICODONE) tablet 10 mg  10 mg Oral Q4H PRN    HYDROmorphone (DILAUDID) injection 1 mg  1 mg IntraVENous Q4H PRN    naloxone (NARCAN) injection 0.4 mg  0.4 mg IntraVENous PRN    ondansetron (ZOFRAN) injection 4 mg  4 mg IntraVENous Q4H PRN    senna-docusate (PERICOLACE) 8.6-50 mg per tablet 1 Tablet  1 Tablet Oral BID        Review of Systems  Pertinent items are noted in HPI. Objective:     Visit Vitals  /62 (BP 1 Location: Right upper arm, BP Patient Position: At rest)   Pulse 84   Temp 97.4 °F (36.3 °C)   Resp 20   Ht 5' 4\" (1.626 m)   Wt 54.4 kg (120 lb)   SpO2 97%   Breastfeeding No   BMI 20.60 kg/m²    O2 Flow Rate (L/min): 2 l/min O2 Device: None (Room air)    Temp (24hrs), Av.9 °F (36.6 °C), Min:97.4 °F (36.3 °C), Max:98.7 °F (37.1 °C)      No intake/output data recorded. 10/11 1901 - 10/13 0700  In: 1367.5 [P.O.:550; I.V.:817.5]  Out: 1580 [Urine:1450; Drains:130]    General appearance - alert, well appearing, and in no distress  Chest -clear air entry noted in bases, no wheezes  Heart - S1 and S2 normal  Abdomen - soft, nontender, nondistended, Bowel sounds present  Neurological - alert, oriented, normal speech, no focal findings noted, no tremors noted. Musculoskeletal - no joint tenderness or erythema of knees bilaterally. Right hip dressing is in situ. Extremities - no pedal edema noted      Data Review    Recent Results (from the past 24 hour(s))   CBC WITH AUTOMATED DIFF    Collection Time: 10/13/22  4:00 AM   Result Value Ref Range    WBC 25.7 (H) 4.6 - 13.2 K/uL    RBC 3.06 (L) 4.20 - 5.30 M/uL    HGB 9.0 (L) 12.0 - 16.0 g/dL    HCT 26.4 (L) 35.0 - 45.0 %    MCV 86.3 78.0 - 100.0 FL    MCH 29.4 24.0 - 34.0 PG    MCHC 34.1 31.0 - 37.0 g/dL    RDW 13.2 11.6 - 14.5 %    PLATELET 805 920 - 307 K/uL    MPV 10.2 9.2 - 11.8 FL    NRBC 0.0 0  WBC    ABSOLUTE NRBC 0.00 0.00 - 0.01 K/uL    NEUTROPHILS 36 (L) 40 - 73 %    LYMPHOCYTES 59 (H) 21 - 52 %    MONOCYTES 3 3 - 10 %    EOSINOPHILS 2 0 - 5 %    BASOPHILS 0 0 - 2 %    IMMATURE GRANULOCYTES 0 %    ABS. NEUTROPHILS 9.3 (H) 1.8 - 8.0 K/UL    ABS. LYMPHOCYTES 15.1 (H) 0.9 - 3.6 K/UL    ABS. MONOCYTES 0.8 0.05 - 1.2 K/UL    ABS. EOSINOPHILS 0.5 (H) 0.0 - 0.4 K/UL    ABS. BASOPHILS 0.0 0.0 - 0.1 K/UL    ABS. IMM.  GRANS. 0.0 K/UL    DF MANUAL      PLATELET COMMENTS ADEQUATE PLATELETS      RBC COMMENTS ANISOCYTOSIS  1+             Assessment/Plan:     Principal Problem:    Displaced fracture of right femoral neck (HCC) (10/11/2022)    Active Problems:    Primary osteoarthritis of both hips (10/11/2022)      GERD (gastroesophageal reflux disease) (10/11/2022)      Urine retention (10/12/2022)      ASSESSMENT:    1. Displaced right femoral neck fracture  2. Osteoarthritis of right hip  3. Chronic leukocytosis  4. Urinary retention, currently resolved    PLAN:    Patient is voiding urine without any issues  She is on Xarelto for DVT prophylaxis  Continue current pain management with bowel protocol  Discharge patient home with outpatient follow-up with Ortho and oncologist.    Disclaimer: Sections of this note are dictated using utilizing voice recognition software, which may have resulted in some phonetic based errors in grammar and contents. Even though attempts were made to correct all the mistakes, some may have been missed, and remained in the body of the document. If questions arise, please contact our department.

## 2022-10-13 NOTE — PROGRESS NOTES
Problem: Mobility Impaired (Adult and Pediatric)  Goal: *Acute Goals and Plan of Care (Insert Text)  Description: Physical Therapy Goals  Initiated 10/12/2022 and to be accomplished within 7 day(s)  1. Patient will move from supine to sit and sit to supine , scoot up and down, and roll side to side in bed with modified independence. 2.  Patient will transfer from bed to chair and chair to bed with modified independence using the least restrictive device. 3.  Patient will perform sit to stand with modified independence. 4.  Patient will ambulate with modified independence for 300 feet with the least restrictive device. 5.  Patient will ascend/descend 7 stairs with unilateral handrail(s) with supervision/set-up. PLOF: Pt reporting she lives in 2 story house with wheelchair lift on outside of home and chair lift for stairs inside. She is independent and lives with . Outcome: Progressing Towards Goal   PHYSICAL THERAPY TREATMENT    Patient: Jo Hutchison (67 y.o. female)  Date: 10/13/2022  Diagnosis: Closed right hip fracture ([facility] [address] [S72.001A] Displaced fracture of right femoral neck (HCC)  Procedure(s) (LRB):  RIGHT TOTAL HIP ARTHROPLASTY/MEDACTA/2 SA'S (Right) 2 Days Post-Op  Precautions: Total hip, WBAT (RLE)      ASSESSMENT:  Pt cleared to participate in PT session, pt received semi-reclined in bed and agreeable to therapy session. Mona bed mobility. Good sitting balance. Standing with supervision to RW, ambulating with SBA x250 feet with RW. Pt then ascending/descending 5 steps with B handrails and step to pattern. Pt returned to bed with Mona. Pt positioned for comfort and educated to call for assist before getting up, pt verbalized understanding. Pt left with all needs met and call bell in reach. RN notified of position and participation.        Progression toward goals:   [x]      Improving appropriately and progressing toward goals  []      Improving slowly and progressing toward goals  []      Not making progress toward goals and plan of care will be adjusted     PLAN:  Patient continues to benefit from skilled intervention to address the above impairments. Continue treatment per established plan of care. Further Equipment Recommendations for Discharge:  rolling walker, BSC    AMPAC: Current research shows that an AM-PAC score of 18 or greater is associated with a discharge to the patient's home setting. Based on an AM-PAC score of 20/24 and their current functional mobility deficits, it is recommended that the patient have 2-3 sessions per week of Physical Therapy at d/c to increase the patient's independence. This AMPAC score should be considered in conjunction with interdisciplinary team recommendations to determine the most appropriate discharge setting. Patient's social support, diagnosis, medical stability, and prior level of function should also be taken into consideration. SUBJECTIVE:   Patient stated I am so tired.     OBJECTIVE DATA SUMMARY:   Critical Behavior:  Neurologic State: Alert  Orientation Level: Oriented X4  Cognition: Follows commands  Safety/Judgement: Awareness of environment, Fall prevention  Functional Mobility Training:  Bed Mobility:     Supine to Sit: Modified independent  Sit to Supine: Modified independent  Scooting: Modified independent    Transfers:  Sit to Stand: Supervision  Stand to Sit: Stand-by assistance    Balance:  Sitting: Intact  Standing: Intact; With support  Standing - Static: Good  Standing - Dynamic : Good      Posture:   Posture (WDL): Within defined limits     Ambulation/Gait Training:  Distance (ft): 250 Feet (ft)  Assistive Device: Walker, rolling  Ambulation - Level of Assistance: Supervision    Gait Abnormalities: Decreased step clearance    Base of Support: Center of gravity altered     Speed/Lazara: Slow;Shuffled  Step Length: Right shortened;Left shortened    Stairs:  Number of Stairs Trained: 5  Stairs - Level of Assistance: Contact guard assistance  Rail Use: Both      Pain:  Pain level pre-treatment: 0/10  Pain level post-treatment: 0/10     Activity Tolerance:   Good     Please refer to the flowsheet for vital signs taken during this treatment. After treatment:   [] Patient left in no apparent distress sitting up in chair  [x] Patient left in no apparent distress in bed  [x] Call bell left within reach  [x] Nursing notified  [] Caregiver present  [] Bed alarm activated  [] SCDs applied      COMMUNICATION/EDUCATION:   [x]         Role of Physical Therapy in the acute care setting. [x]         Fall prevention education was provided and the patient/caregiver indicated understanding. [x]         Patient/family have participated as able in working toward goals and plan of care. [x]         Patient/family agree to work toward stated goals and plan of care. []         Patient understands intent and goals of therapy, but is neutral about his/her participation. []         Patient is unable to participate in stated goals/plan of care: ongoing with therapy staff.  []         Other:        Bunny Homans, PT   Time Calculation: 23 mins    Monik Gonzales -PAC® Basic Mobility Inpatient Short Form (6-Clicks) Version 2    How much HELP from another person does the patient currently need    (If the patient hasn't done an activity recently, how much help from another person do you think he/she would need if he/she tried?)   Total (Total A or Dep)   A Lot  (Mod to Max A)   A Little (Sup or Min A)   None (Mod I to I)   Turning from your back to your side while in a flat bed without using bedrails? [] 1 [] 2 [] 3 [x] 4   2. Moving from lying on your back to sitting on the side of a flat bed without using bedrails? [] 1 [] 2 [] 3 [x] 4   3. Moving to and from a bed to a chair (including a wheelchair)? [] 1 [] 2 [x] 3 [] 4   4. Standing up from a chair using your arms (e.g., wheelchair, or bedside chair)?    [] 1 [] 2 [x] 3 [] 4   5. Walking in hospital room? [] 1 [] 2 [x] 3 [] 4   6. Climbing 3-5 steps with a railing?+   [] 1 [] 2 [x] 3 [] 4   +If stair climbing cannot be assessed, skip item #6. Sum responses from items 1-5.

## 2022-10-14 ENCOUNTER — HOME CARE VISIT (OUTPATIENT)
Dept: SCHEDULING | Facility: HOME HEALTH | Age: 77
End: 2022-10-14
Payer: MEDICARE

## 2022-10-14 ENCOUNTER — TELEPHONE (OUTPATIENT)
Dept: ORTHOPEDIC SURGERY | Age: 77
End: 2022-10-14

## 2022-10-14 ENCOUNTER — HOME CARE VISIT (OUTPATIENT)
Dept: HOME HEALTH SERVICES | Facility: HOME HEALTH | Age: 77
End: 2022-10-14

## 2022-10-14 VITALS
DIASTOLIC BLOOD PRESSURE: 72 MMHG | RESPIRATION RATE: 17 BRPM | HEART RATE: 99 BPM | SYSTOLIC BLOOD PRESSURE: 132 MMHG | TEMPERATURE: 98.3 F | OXYGEN SATURATION: 99 %

## 2022-10-14 PROCEDURE — 400013 HH SOC

## 2022-10-14 PROCEDURE — 3331090002 HH PPS REVENUE DEBIT

## 2022-10-14 PROCEDURE — 3331090001 HH PPS REVENUE CREDIT

## 2022-10-14 PROCEDURE — G0151 HHCP-SERV OF PT,EA 15 MIN: HCPCS

## 2022-10-14 PROCEDURE — 400018 HH-NO PAY CLAIM PROCEDURE

## 2022-10-14 NOTE — Clinical Note
Thanks Mackenzie Schroeder  ----- Message -----  From: Dixon Snider, PT  Sent: 10/14/2022   5:23 PM EDT  To: Tre Pearson, PTA, Calista Mahajan, ROBERT Valdez,     You will be seeing this pt for treatment. Admitted with RTHR, s/p fall,  with THR precautions/lateral approach. Heel slides to 80 degrees, and hip abduction to 20 degrees for HEP per referral.      Please focus on:   1.supine, seated and standing (when tolerating)  HEP per THR protocol  2. Bed mob   3. safety with transfers and review of THR precautions  4. gait training in home with least AD,  (very cluttered home and a lot of objects in pathways. Please do staris with cane up and rail this weekend. 5. balance challenges when ready. 6. return to PLOF or maximize indep.      Any questions give me a call    Babar Medeiros

## 2022-10-14 NOTE — HOME HEALTH
ADMISSION OASIS  Admission Summary: Ms Anne Dahl  is a  67year old female being admitted to home health for PT/SN services due to s/p fall with R closed hip  femur fracture. Pt was admitted for surgery on 10/11/22 to 10/13/22  for R WILLOW by Dr. Misti Michaud. Pt is WBAT RLE with THR precautions. Patient participated in goal setting and care planning and are agreeable to the care plan. Discharge planning/training was initiated for  independence and Outpt. PT as recommended by Dr. Misti Michaud. Admission booklet reviewed with patient; including review of rights and responsibilities, discharge/transfer policies, and contact information for , CM, Medicare, QIO, and outside resources for independence. PMHx:GERD  SUBJECTIVE:  \"I can't believe I did this. I need to get back up and running. \"  Pt reports 7/10 and decreased to lowest of 4/10 over the past 24 hours. REQUIRES CAREGIVER ASSISTANCE FOR: can   assists with  transportation  PLOF: Pt lives with  in 2 level with steps to enter and full flight of steps inside. Does have access to stair glide if necessary indoors. Pt is indep with ADLs, and all home tasks, medications and driving PTA. Pt was indep with no device   DME: FWW, cane, reacher  - PT to order 2 commodes for upstairs and downstairs in home. MEDICATIONS REVIEWED AND UPDATED: Medications reconciled and not all medications are available in the home this visit (but did not have Oxycodone, Xarelto, and Zofran). The following education was provided regarding high risk medications  (Oxycodone 5 mg (not in home but educated on symptoms of consitpation if taking as well as to take as directed), medication interactions, and look a like medications: Continue medication as prescribed by MD. Medications are effective at this time.    PT called and spoke with Tamara Musa in Dr. Misti Michaud office to clarify Xarelto, as pt stated the nurse at hospital told her to start taking Aspirin 81 mg but DC summary reads to take Xarelto for 5 days then begin taking Aspirin. Awaiting call back. Pt also awaiting for [facility] or pharmacy to deliver the Oxycodone. Will educated on Xarelto if pt needs to take and receives. No call back from Dr. Dione Gunn by end of business day. NEXT MD APPT:  with Dr. Dione Gunn 10/21/22 @ 9: 00 am   ROM:R hip to 80 degrees flexion and hip abduction to 20 degress as per referral, otherwise LLE intact. STRENGTH:  see strength section for details. WOUNDS: R hip incision site with honeycomb dressing in place. Dressing change to be done on POD #7- 10/18/22. Honey comb dressing replacements order via [employer] for delivery to pt. BED MOBILITY: sup <> sit with SBA. Pt using UE to assist RLE into bed. TRANSFERS: sit to stand from bed, chair  with SBA , and toilet with [place] due to low surfaces and not safe due to THR precautions. Pt would benefit from having 2 commodes so one is able to be placed in upstairs and one downstairs for safety and accessibility. Cues to not break 90 degree precautions by marching knee up or leaning too far foward. GAIT: pt ambulated  21' x2, 48' x2 with FWW and close SBA with cues for safety, as pt has pathways that need to be cleared and clutter surrounding her in the rooms PT and pt were present in. Pt lifting up walker to go over small chair in hallway and asked pt if PT could move and pt requested it stay in same place. STAIRS: up and down flight of steps (14) with handrail and use of wall for support. Pt would benefit from education on use of cane up and down for extra support. Pt also has stair glide as needed. BALANCE: Fair with FWW. PATIENT EDUCATION PROVIDED THIS VISIT: safety for transfers and proper hand placement and THR precautions reviewed.   HEP per handout, walking with device at all times, step through gait pattern, deep breathing/PLB, ice pack with barrier between R hip and skin for 20 min on and 40 min off,  clearing objects and clear pathways for fall prevention as well as well lit areas at nighttime, signs and symptoms of infection. HEP consisting of:  1. Walking every 30 min to 1 hour during the day with FWW   2. R THR protocol per handout given to pt for supine and sitting exercises. Written HEP issued, patient/caregiver verbalized understanding. Patient Level of understanding of education provided: pt able to return demo HEP for supine and sitting exercise with assistance and cues for proper technique. Patient response to treatment: Pt reported no increase in pain throughout treatment. CONTINUED NEED FOR THE FOLLOWING SKILLS: HH PT is medically necessary to  address R hip pain, decreased ROM of R hip,  decreased strength, increased swelling, decreased independence with bed mob and  functional transfers, impaired gait, impaired stair negotiation, and impaired balance in order to improve functional independence, quality of life, return to PLOF, and reduce the risk for falls.   PLAN: 2w1, 6w1, until seen for f/u with Dr. Shawna Lopez on 10/21/22 @ 9 am.   DISCHARGE PLANNING DISCUSSED: Discharge to self and family under MD supervision once all goals have been met or patient has reached max potential.

## 2022-10-14 NOTE — Clinical Note
Dear Alan Ruiz ,     This message is to inform you that your patient,  GLORIA Pavon, 43,  has been admitted to  [facility] services under Home PT today. It was determined that pt will benefit from Home PT for 2w1, 6w1, to improve strength, ROM,  and endurance, gait, balance, safety on stairs, and return to PLOF. Any questions please contact me at [phone].     Sincerely,     Mary Martinez, PT

## 2022-10-14 NOTE — Clinical Note
Tyronne Cooks and Conner Quinonez will be seeing this pt for treatment. Admitted with RTHR, s/p fall,  with THR precautions/lateral approach. Heel slides to 80 degrees, and hip abduction to 20 degrees for HEP per referral.      Please focus on:   1.supine, seated and standing (when tolerating)  HEP per THR protocol  2. Bed mob   3. safety with transfers and review of THR precautions  4. gait training in home with least AD,  (very cluttered home and a lot of objects in pathways. Please do staris with cane up and rail this weekend. 5. balance challenges when ready. 6. return to PLOF or maximize indep.      Any questions give me a call    Stefany Killian

## 2022-10-14 NOTE — TELEPHONE ENCOUNTER
Call received from [facility] health physical therapist Vel Oro) needing clarification on the patients medication. Lesli Faria is asking if it is okay for the patient to continue to take Xarelto, or should the patient discontinue taking Xarelto and just take aspirin? Lesli Faria also states that the patient doesn't have any Xarelto medication, and would need to have a prescription called in for her. Please advise. Lesli Faria can be reached at [phone].

## 2022-10-15 ENCOUNTER — HOME CARE VISIT (OUTPATIENT)
Dept: HOME HEALTH SERVICES | Facility: HOME HEALTH | Age: 77
End: 2022-10-15
Payer: MEDICARE

## 2022-10-15 ENCOUNTER — HOME CARE VISIT (OUTPATIENT)
Dept: SCHEDULING | Facility: HOME HEALTH | Age: 77
End: 2022-10-15
Payer: MEDICARE

## 2022-10-15 PROCEDURE — 3331090001 HH PPS REVENUE CREDIT

## 2022-10-15 PROCEDURE — G0157 HHC PT ASSISTANT EA 15: HCPCS

## 2022-10-15 PROCEDURE — 3331090002 HH PPS REVENUE DEBIT

## 2022-10-15 NOTE — Clinical Note
call the wife's number. Main number in system.  is [place] and can't talk on the phone. Isabel Garcia  ----- Message -----  From: Tj Sanabria RN  Sent: 10/15/2022   4:03 PM EDT  To: Antonella Moreira MD, [place], ROBERT, *      I called pateint and Spouse both twice, left messages, no responses,  told.

## 2022-10-16 ENCOUNTER — HOME CARE VISIT (OUTPATIENT)
Dept: SCHEDULING | Facility: HOME HEALTH | Age: 77
End: 2022-10-16
Payer: MEDICARE

## 2022-10-16 PROCEDURE — 3331090002 HH PPS REVENUE DEBIT

## 2022-10-16 PROCEDURE — G0157 HHC PT ASSISTANT EA 15: HCPCS

## 2022-10-16 PROCEDURE — 3331090001 HH PPS REVENUE CREDIT

## 2022-10-17 ENCOUNTER — HOME CARE VISIT (OUTPATIENT)
Dept: SCHEDULING | Facility: HOME HEALTH | Age: 77
End: 2022-10-17
Payer: MEDICARE

## 2022-10-17 VITALS
HEART RATE: 100 BPM | OXYGEN SATURATION: 99 % | OXYGEN SATURATION: 99 % | TEMPERATURE: 98.5 F | RESPIRATION RATE: 13 BRPM | DIASTOLIC BLOOD PRESSURE: 74 MMHG | DIASTOLIC BLOOD PRESSURE: 74 MMHG | TEMPERATURE: 98.3 F | SYSTOLIC BLOOD PRESSURE: 122 MMHG | SYSTOLIC BLOOD PRESSURE: 123 MMHG | HEART RATE: 95 BPM | RESPIRATION RATE: 14 BRPM

## 2022-10-17 PROCEDURE — G0157 HHC PT ASSISTANT EA 15: HCPCS

## 2022-10-17 PROCEDURE — 3331090002 HH PPS REVENUE DEBIT

## 2022-10-17 PROCEDURE — 3331090001 HH PPS REVENUE CREDIT

## 2022-10-17 NOTE — TELEPHONE ENCOUNTER
Per Juan Pablo Has is 10mg p.o. daily for 5 days then discontinue and begin 81 mg of aspirin thereafter     Called and spoke to patient to make her aware that she should be taking the 81mg of aspirin at this point. Patient verbalized understanding and thanked writer for the call.

## 2022-10-17 NOTE — HOME HEALTH
Subjective: Patient relays that she had some soreness after last visit but it did not persist into today. She states that she was able to wash her hair in the sink and it has helped overall with how she feels. She is looking forward to attempting to get outside today. Medications have still not yet arrived at home from mail order pharmacy. Objective: Skilled home health physical therapy interventions completed today listed in care plan section. Interventions performed today to assist in return to prior level of function, address deficits as apparent upon time of initial evaluation, return to community and personal activities, and progress further towards goals as previously established in plan of care. There are not any changes to medications upon timing of this visit. Home health supplies were not ordered/delivered today. Visual inspection finds OpSite visible dressing intact with moderate drainage into dressing but not saturated enough to change early with incision not visible and gauze dressing over drain site wound with no noted drainage into gauze. Assessment:  Patient is progressing towards goals as previously established in POC with skilled home health physical therapy services at this time as made apparent by improving overall mobility as apparent by ability to ambulate a greater distance outside today. She was able to safely navigate stairs outside with single point cane and handrail and should be able to do so inside if chair lift is ever non-functioning. Family/caregiver spouse involvement is present/set-up at this point in time and assists with meals, transport and general encouragement. Again noted moderate fatigue upon treatment completion today. Plan:  Discharge planning discussed at this visit regarding eventual discharge once patient has met goals and/or met maximum benefit from home health skilled PT services with patient understanding and agreeable at this time.  Continued need for skilled home health PT at this time to address deficits, reduce risk of falls, and obtain goals as previously established per plan of care. Continue with stair training and outside ambulation as weather permits. Monitor dressing status and drainage level.

## 2022-10-17 NOTE — HOME HEALTH
Subjective: Patient relays that she has been completing her given HEP as instructed and that she still has not gotten her pain medication delivered to her. She states that she has had little difficulty navigating the home recently and uses the chair lift to navigate the stairs inside the home. Objective: Skilled home health physical therapy interventions completed today listed in care plan section. Interventions performed today to assist in return to prior level of function, address deficits as apparent upon time of initial evaluation, return to community and personal activities, and progress further towards goals as previously established in plan of care. There are not any changes to medications upon timing of this visit. Home health supplies were not ordered/delivered today. Reinforced safety on chair lift including wearing seatbelt to improve safety using chair lift with patient verbally agreeing to use seatbelt on lift in future. Assessment:  Patient is progressing towards goals as previously established in POC with skilled home health physical therapy services at this time as made apparent by improving overall mobility including ability to display improved heel strike timing with cueing for corrections. Patient reported mild fatigue upon treatment completion today. Family/caregiver spouse involvement is present/set-up at this point in time and assists with meals, transport and general encouragement. Plan:  Discharge planning discussed at this visit regarding eventual discharge once patient has met goals and/or met maximum benefit from home health skilled PT services with patient understanding and agreeable at this time. Continued need for skilled home health PT at this time to address deficits, reduce risk of falls, and obtain goals as previously established per plan of care. Further gait training possibly outside and train in use of cane on stairs out front of home.

## 2022-10-18 ENCOUNTER — HOME CARE VISIT (OUTPATIENT)
Dept: HOME HEALTH SERVICES | Facility: HOME HEALTH | Age: 77
End: 2022-10-18
Payer: MEDICARE

## 2022-10-18 ENCOUNTER — HOME CARE VISIT (OUTPATIENT)
Dept: SCHEDULING | Facility: HOME HEALTH | Age: 77
End: 2022-10-18
Payer: MEDICARE

## 2022-10-18 VITALS
OXYGEN SATURATION: 100 % | SYSTOLIC BLOOD PRESSURE: 123 MMHG | DIASTOLIC BLOOD PRESSURE: 86 MMHG | HEART RATE: 89 BPM | TEMPERATURE: 98.8 F

## 2022-10-18 VITALS
HEART RATE: 80 BPM | TEMPERATURE: 98.5 F | SYSTOLIC BLOOD PRESSURE: 140 MMHG | DIASTOLIC BLOOD PRESSURE: 80 MMHG | OXYGEN SATURATION: 99 %

## 2022-10-18 PROCEDURE — G0157 HHC PT ASSISTANT EA 15: HCPCS

## 2022-10-18 PROCEDURE — 3331090002 HH PPS REVENUE DEBIT

## 2022-10-18 PROCEDURE — 3331090001 HH PPS REVENUE CREDIT

## 2022-10-18 NOTE — HOME HEALTH
SUBJECTIVE:  No c/o pain, no changes in medications pt has yet to receive Oxycodone , Zofran, or Xarelto instructed to contact Surgeon, pt does not have a PCP, arrived to door using [facility] stating it is easier than using the walker  CAREGIVER INVOLVEMENT/ASSISTANCE NEEDED FOR: Pt Ind with ADLs   OBJECTIVE:  See interventions. PATIENT EDUCATION PROVIDED THIS VISIT: Reviewed pain management, fall prevention, hip precautions and s/s of infection    PATIENT RESPONSE TO EDUCATION PROVIDED: Pt able to teach back all education   PATIENT RESPONSE TO TREATMENT/ASSESSMENT OF PROGRESS TOWARD GOALS: Pt progressing toward established goals progressed to [facility] for ambulation on even surfaces VCs and Demonstration to improve sequencing and cane placement, able to return demonstrate with improved safety, continued training to improve nemo and base of support to improve quality of gait and safety, performed stair negotiation tng with use of SPC and Single rail Cues for sequencing pt was able to return demonstrate safe stair negotiation, declined bed mobility tng. pt able to tolerate challenges to static and dymanic balance without loss of balance. No increase in pain reported, c/ RPE 5 following tx session.    PLAN FOR NEXT VISIT: Next visit will review HEP and Update as appropriate, gait with LRAD on uneven surfaces as tolerated    THE FOLLOWING DISCHARGE PLANNING WAS DISCUSSED WITH THE PATIENT/CAREGIVER: POC and Discharge plans discussed with patient pt is daily through 10/20/22 anticipted DC 10/21/22

## 2022-10-18 NOTE — HOME HEALTH
SUBJECTIVE: No pain reported at rest,  no changes in medications no falls reported, pt has yet to receive Oxycodone , Zofran, or Xarelto  CAREGIVER INVOLVEMENT/ASSISTANCE NEEDED FOR: Pt requires minimal assistance with ADLs spouse is assisting as needed   OBJECTIVE:  See interventions. PATIENT EDUCATION PROVIDED THIS VISIT: Pt educated in pain management encouraged to use ice more often if pain increases with increased activity, instructed in  hip precautions and s/s of infection. Extensive education on fall prevention with recommendation to clear pathways for safe ambulation. PATIENT RESPONSE TO EDUCATION PROVIDED: Pt understands the improtance of fall prevention and keeping pathways clear but states she is able to manage and has a dog she is trying to confine, able to teach back hip precautions and s/s of infection    PATIENT RESPONSE TO TREATMENT/ASSESSMENT OF PROGRESS TOWARD GOALS: Pt progressing toward established goals demonstrates safe sit to stand transfers from armed chair, requires instruction and demonstration to perform HEP to improve posture and tech VCs also required to slow nemo, gait tng tng performed on even surfaces wth use of FWW instruction to decrease cadece for safety, pt ambulates with decreased stride length, hip flexion and heel strike on right provided VCs to correct pt was able to return demonstrate improved nemo  and heel strike.   PLAN FOR NEXT VISIT: Next visit will review HEP and Update as appropriate, will address fall prevention, stair negotiation, gait and balance   THE FOLLOWING DISCHARGE PLANNING WAS DISCUSSED WITH THE PATIENT/CAREGIVER: POC and Discharge plans discussed with patient pt is daily through 10/20/22 anticipted DC 10/21/22

## 2022-10-19 ENCOUNTER — HOME CARE VISIT (OUTPATIENT)
Dept: SCHEDULING | Facility: HOME HEALTH | Age: 77
End: 2022-10-19
Payer: MEDICARE

## 2022-10-19 VITALS
DIASTOLIC BLOOD PRESSURE: 66 MMHG | SYSTOLIC BLOOD PRESSURE: 133 MMHG | TEMPERATURE: 98 F | HEART RATE: 80 BPM | OXYGEN SATURATION: 99 %

## 2022-10-19 PROCEDURE — 3331090002 HH PPS REVENUE DEBIT

## 2022-10-19 PROCEDURE — 3331090001 HH PPS REVENUE CREDIT

## 2022-10-19 PROCEDURE — G0157 HHC PT ASSISTANT EA 15: HCPCS

## 2022-10-19 NOTE — HOME HEALTH
SUBJECTIVE:  No c/o pain, no changes in medications, FU with surgeon on 10/21/22   CAREGIVER INVOLVEMENT/ASSISTANCE NEEDED FOR: Pt Ind with ADLs   OBJECTIVE:  See interventions. PATIENT EDUCATION PROVIDED THIS VISIT: Reviewed fall prevention, hip precautions and s/s of infection    PATIENT RESPONSE TO EDUCATION PROVIDED: Pt able to teach back all education   PATIENT RESPONSE TO TREATMENT/ASSESSMENT OF PROGRESS TOWARD GOALS: Pt progressing well toward established goals tolerated gait tng outside on uneven surfaces x 200ft without an assistive device occasional Sup/VCs to slow nemo pt able to return demostrate improved safety followng cues, demonstrates safe stair negotiation to exit/enter home x 6 stes with rail and SPC using step to pattern to ascend, alternation to descend. VC and Demonstration to recall standing HEP and to improve tech added alternating toe taps on steps pt was able to perfrom without difficulty.   No pain reported following tx session, RPE 4   PLAN FOR NEXT VISIT: Next visit will review/update HEP, will address strength and gait with appropriate assistive device, prepared patient for DC     THE FOLLOWING DISCHARGE PLANNING WAS DISCUSSED WITH THE PATIENT/CAREGIVER: POC and Discharge plans discussed with patient pt is daily through 10/20/22 anticipted DC 10/21/22

## 2022-10-20 ENCOUNTER — HOME CARE VISIT (OUTPATIENT)
Dept: SCHEDULING | Facility: HOME HEALTH | Age: 77
End: 2022-10-20
Payer: MEDICARE

## 2022-10-20 VITALS
HEART RATE: 89 BPM | SYSTOLIC BLOOD PRESSURE: 137 MMHG | OXYGEN SATURATION: 100 % | DIASTOLIC BLOOD PRESSURE: 77 MMHG | TEMPERATURE: 98 F

## 2022-10-20 PROCEDURE — 3331090002 HH PPS REVENUE DEBIT

## 2022-10-20 PROCEDURE — 3331090001 HH PPS REVENUE CREDIT

## 2022-10-20 PROCEDURE — G0157 HHC PT ASSISTANT EA 15: HCPCS

## 2022-10-21 ENCOUNTER — OFFICE VISIT (OUTPATIENT)
Dept: ORTHOPEDIC SURGERY | Age: 77
End: 2022-10-21
Payer: MEDICARE

## 2022-10-21 ENCOUNTER — HOME CARE VISIT (OUTPATIENT)
Dept: SCHEDULING | Facility: HOME HEALTH | Age: 77
End: 2022-10-21
Payer: MEDICARE

## 2022-10-21 VITALS
HEART RATE: 85 BPM | OXYGEN SATURATION: 99 % | DIASTOLIC BLOOD PRESSURE: 76 MMHG | SYSTOLIC BLOOD PRESSURE: 140 MMHG | TEMPERATURE: 97.9 F | RESPIRATION RATE: 17 BRPM

## 2022-10-21 VITALS — BODY MASS INDEX: 20.49 KG/M2 | TEMPERATURE: 97.5 F | HEIGHT: 64 IN | WEIGHT: 120 LBS

## 2022-10-21 DIAGNOSIS — Z96.641 AFTERCARE FOLLOWING RIGHT HIP JOINT REPLACEMENT SURGERY: ICD-10-CM

## 2022-10-21 DIAGNOSIS — Z48.89 ENCOUNTER FOR POSTOPERATIVE WOUND CHECK: Primary | ICD-10-CM

## 2022-10-21 DIAGNOSIS — Z47.1 AFTERCARE FOLLOWING RIGHT HIP JOINT REPLACEMENT SURGERY: ICD-10-CM

## 2022-10-21 DIAGNOSIS — G89.18 POSTOPERATIVE PAIN: ICD-10-CM

## 2022-10-21 DIAGNOSIS — Z48.02: ICD-10-CM

## 2022-10-21 PROCEDURE — 3331090001 HH PPS REVENUE CREDIT

## 2022-10-21 PROCEDURE — 3331090002 HH PPS REVENUE DEBIT

## 2022-10-21 PROCEDURE — G0151 HHCP-SERV OF PT,EA 15 MIN: HCPCS

## 2022-10-21 PROCEDURE — 99024 POSTOP FOLLOW-UP VISIT: CPT | Performed by: PHYSICIAN ASSISTANT

## 2022-10-21 NOTE — PROGRESS NOTES
Samantha Romero returns the office 10 days status post her primary right total hip replacement secondary to a severe comminuted intertrochanteric right hip fracture. Patient is at home currently. She is doing well. She is full weightbearing of the right lower extremity. She is continuing physical therapy and home. She has discontinued use of all of her pain medication. Right hip reveals over the proximal anterior region a intact 18 cm surgical incision intact with wound borders well approximated. Skin staples are noted. Soft tissue swelling bracketing the surgical site is present with no evidence of erythremic changes. No surrounding induration or fluctuance. No evidence of hematoma/seroma. Passive internal/external rotation of the hip noted at 10 and 12 degrees respect without pain or guarding. Quad and femoral nerve activity full right lower extremity. Procedural: Using clean technique all staples today removed with no complications. Sterile strips were approximated with a sterile top-cover. Plan: Patient will continue home therapy per protocol. Continue weightbearing as tolerated right lower extremity. Continue aspirin for thromboembolism prophylaxis. Patient declined outpatient physical therapy services at this time. She was recommended a single post cane for ambulation assistance versus 4 post rolling walker up. She may get the surgical site wet but avoid any soaking. She will follow back in 2 weeks for x-ray of the right hip. Hip dislocation precautions discussed today. All questions answered to her satisfaction.

## 2022-11-04 ENCOUNTER — OFFICE VISIT (OUTPATIENT)
Dept: ORTHOPEDIC SURGERY | Age: 77
End: 2022-11-04
Payer: MEDICARE

## 2022-11-04 VITALS
OXYGEN SATURATION: 95 % | TEMPERATURE: 98.6 F | HEIGHT: 64 IN | BODY MASS INDEX: 22.2 KG/M2 | HEART RATE: 106 BPM | WEIGHT: 130 LBS

## 2022-11-04 DIAGNOSIS — Z96.641 AFTERCARE FOLLOWING RIGHT HIP JOINT REPLACEMENT SURGERY: Primary | ICD-10-CM

## 2022-11-04 DIAGNOSIS — Z47.1 AFTERCARE FOLLOWING RIGHT HIP JOINT REPLACEMENT SURGERY: Primary | ICD-10-CM

## 2022-11-04 DIAGNOSIS — Z48.89 ENCOUNTER FOR POSTOPERATIVE WOUND CHECK: ICD-10-CM

## 2022-11-04 PROCEDURE — 73502 X-RAY EXAM HIP UNI 2-3 VIEWS: CPT | Performed by: PHYSICIAN ASSISTANT

## 2022-11-04 PROCEDURE — 99024 POSTOP FOLLOW-UP VISIT: CPT | Performed by: PHYSICIAN ASSISTANT

## 2022-11-04 NOTE — PROGRESS NOTES
Venkatesh Davis returns the office just short of 1 month status post her primary right total hip replacement secondary to a severe comminuted intertrochanteric right hip fracture. Patient is at home currently. She is doing well. She is full weightbearing of the right lower extremity. She is continuing physical therapy and home. She has discontinued use of all of her pain medication. Right hip reveals over the proximal anterior region a intact 18 cm surgical incision intact with wound borders well approximated. Soft tissue swelling bracketing the surgical site is present with no evidence of erythremic changes. No surrounding induration or fluctuance. No evidence of hematoma/seroma. Passive internal/external rotation of the hip noted at 10 and 12 degrees respect without pain or guarding. Quad and femoral nerve activity full right lower [facility] cros[facility] lateral right hip reveals total joint prosthesis intact with no evidence of periprosthetic fracture or dislocation. Alignment is anatomical.  No lytic or blastic lesions. Plan: Patient will continue her own home self-guided exercises. No need for outpatient physical therapy at this time. Patient to follow back in 1 month. Emollient cream or ointment recommended for skin. hip dislocation precautions discussed today. All questions answered to her satisfaction.

## 2022-12-05 ENCOUNTER — OFFICE VISIT (OUTPATIENT)
Dept: ORTHOPEDIC SURGERY | Age: 77
End: 2022-12-05
Payer: MEDICARE

## 2022-12-05 VITALS
BODY MASS INDEX: 22.2 KG/M2 | WEIGHT: 130 LBS | HEART RATE: 89 BPM | HEIGHT: 64 IN | TEMPERATURE: 97.7 F | OXYGEN SATURATION: 100 %

## 2022-12-05 DIAGNOSIS — Z47.1 AFTERCARE FOLLOWING RIGHT HIP JOINT REPLACEMENT SURGERY: Primary | ICD-10-CM

## 2022-12-05 DIAGNOSIS — Z96.641 AFTERCARE FOLLOWING RIGHT HIP JOINT REPLACEMENT SURGERY: Primary | ICD-10-CM

## 2022-12-05 PROCEDURE — 99024 POSTOP FOLLOW-UP VISIT: CPT | Performed by: PHYSICIAN ASSISTANT

## 2022-12-05 NOTE — PROGRESS NOTES
Tracey Diaz returns the office just short of 6-week status post her primary right total hip replacement secondary to a right femoral neck fracture. She doing very well today. She is self directing her physical therapy as a. She is full weightbearing of her right lower extremity. She has wean from using a single post cane. On exam the right hip surgical site intact healed nicely. No evidence of wound dehiscence or infection. No surrounding erythema induration or fluctuance. The right hip passive internal and external rotation with her sitting at bedside right knee flexed at 90 degrees noted at 15 and 18 degrees respectively. No pain or guarding noted. Flexor strength noted at 5-/5 against resistance. Abduction strength on the right also 5-/5. Quad and femoral nerve activity full right lower extremity. Plan: Patient seen by Dr. Jamal Horan. Patient to continue her self-guided physical therapy. Hip dislocation precautions discussed again. Antibiotics for lifetime also discussed. All her questions answered to her satisfaction. Follow-up in 1 year.

## 2023-09-19 NOTE — PROGRESS NOTES
10/12/22 0622   External Urinary Catheter 10/11/22   Placement Date/Time: 10/11/22 2034   Insertion Practices: Hand hygiene;Perineal cleansing; Connected to suction   Urine Output (mL) 0 ml   Urine Assessment   Urinary Status Has not voided   $$ Bladder Scan (mL of urine) 763 mL   Dr. Gamboa Clint is informed. New order to straight cath is received. Other

## (undated) DEVICE — INTENDED FOR TISSUE SEPARATION, AND OTHER PROCEDURES THAT REQUIRE A SHARP SURGICAL BLADE TO PUNCTURE OR CUT.: Brand: BARD-PARKER SAFETY BLADES SIZE 10, STERILE

## (undated) DEVICE — REM POLYHESIVE ADULT PATIENT RETURN ELECTRODE: Brand: VALLEYLAB

## (undated) DEVICE — TRAY,URINE METER,100% SILICONE,16FR10ML: Brand: MEDLINE

## (undated) DEVICE — SUTURE MCRYL SZ 4-0 L27IN ABSRB UD L24MM PS-1 3/8 CIR PRIM Y935H

## (undated) DEVICE — NEEDLE INSUF L120MM DIA2MM DISP FOR PNEUMOPERI ENDOPATH

## (undated) DEVICE — 3M™ IOBAN™ 2 ANTIMICROBIAL INCISE DRAPE 6651EZ: Brand: IOBAN™ 2

## (undated) DEVICE — SOLUTION IV 1000ML 0.9% SOD CHL

## (undated) DEVICE — GOWN ISOL IMPERV UNIV, DISP, OPEN BACK, BLUE --

## (undated) DEVICE — PACK PROCEDURE SURG TOT HIP BSHR LF

## (undated) DEVICE — HANDPIECE SET WITH HIGH FLOW TIP AND SUCTION TUBE: Brand: INTERPULSE

## (undated) DEVICE — Device

## (undated) DEVICE — SOLUTION IRRIG 3000ML 0.9% SOD CHL FLX CONT 0797208] ICU MEDICAL INC]

## (undated) DEVICE — Device: Brand: JELCO

## (undated) DEVICE — SUTURE VCRL SZ 2-0 L36IN ABSRB UD L36MM CT-1 1/2 CIR J945H

## (undated) DEVICE — BAG SPEC RETRV 275ML 10ML DISPOSABLE RELIACATCH

## (undated) DEVICE — SYRINGE IRRIG 60ML SFT PLIABLE BLB EZ TO GRP 1 HND USE W/

## (undated) DEVICE — BLANKET WRM AD W50XL85.8IN PACU FULL BODY FORC AIR

## (undated) DEVICE — SPHINCTEROTOME: Brand: DREAMTOME™ RX 44

## (undated) DEVICE — INTENDED FOR TISSUE SEPARATION, AND OTHER PROCEDURES THAT REQUIRE A SHARP SURGICAL BLADE TO PUNCTURE OR CUT.: Brand: BARD-PARKER SAFETY BLADES SIZE 15, STERILE

## (undated) DEVICE — 3M™ BAIR PAWS FLEX™ WARMING GOWN, STANDARD, 20 PER CASE 81003: Brand: BAIR PAWS™

## (undated) DEVICE — (D)GLOVE EXAM LG NITRL NS -- DISC BY MFR NO SUB

## (undated) DEVICE — T4 HOOD

## (undated) DEVICE — TUBE NG 16FR L48IN 2 LUMN W/ PREVENT ANTIREFLX FLTR FOR FLD

## (undated) DEVICE — GOWN,REINFORCED,POLY,AURORA,XLARGE,STRL: Brand: MEDLINE

## (undated) DEVICE — 3M™ TEGADERM™ TRANSPARENT FILM DRESSING FRAME STYLE, 1626W, 4 IN X 4-3/4 IN (10 CM X 12 CM), 50/CT 4CT/CASE: Brand: 3M™ TEGADERM™

## (undated) DEVICE — MEDI-VAC NON-CONDUCTIVE SUCTION TUBING: Brand: CARDINAL HEALTH

## (undated) DEVICE — 4-PORT MANIFOLD: Brand: NEPTUNE 2

## (undated) DEVICE — KENDALL SCD EXPRESS SLEEVES, KNEE LENGTH, MEDIUM: Brand: KENDALL SCD

## (undated) DEVICE — 2108 SERIES SAGITTAL BLADE (24.8 X 1.24 X 80.1MM)

## (undated) DEVICE — AIRLIFE™ ADULT OXYGEN MASK VINYL, UNDER-THE-CHIN STYLE, MEDIUM CONCENTRATION MASK WITH 7 FEET (2.1 M) CRUSH-RESISTANT OXYGEN TUBING: Brand: AIRLIFE™

## (undated) DEVICE — PACK PROCEDURE SURG LAPAROSCOPY 17X7 MM BRTRC PRIMUS

## (undated) DEVICE — KIT EVAC 400CC DIA1/4IN H PAT 12.5IN 3 SPR RND SHP PVC DRN

## (undated) DEVICE — (D)SYR 10ML 1/5ML GRAD NSAF -- PKGING CHANGE USE ITEM 338027

## (undated) DEVICE — DRESSING FOAM DISK DIA1IN H 7MM HYDRPHLC CHG IMPREG IN SL

## (undated) DEVICE — KIT CLN UP BON SECOURS MARYV

## (undated) DEVICE — GLOVE SURG SZ 7.5 L11.73IN FNGR THK9.8MIL STRW LTX POLYMER

## (undated) DEVICE — PREMIUM DRY TRAY LF: Brand: MEDLINE INDUSTRIES, INC.

## (undated) DEVICE — Device: Brand: MEDEX

## (undated) DEVICE — MEDI-VAC SUCTION HIGH CAPACITY: Brand: CARDINAL HEALTH

## (undated) DEVICE — FLEX ADVANTAGE 3000CC: Brand: FLEX ADVANTAGE

## (undated) DEVICE — BLADELESS OPTICAL TROCAR WITH FIXATION CANNULA: Brand: VERSAPORT

## (undated) DEVICE — BLADELESS OPTICAL TROCAR WITH FIXATION CANNULA: Brand: VERSAONE

## (undated) DEVICE — GARMENT,MEDLINE,DVT,INT,CALF,MED, GEN2: Brand: MEDLINE

## (undated) DEVICE — APPLIER CLP M/L SHFT DIA5MM 15 LIG LIGAMAX 5

## (undated) DEVICE — RETRIEVAL BALLOON CATHETER: Brand: EXTRACTOR™ PRO RX

## (undated) DEVICE — SOLUTION IRRIG 1000ML H2O STRL BLT

## (undated) DEVICE — CLIP APPLIER WITH CLIP LOGIC TECHNOLOGY: Brand: ENDO CLIP III

## (undated) DEVICE — SYR LR LCK 1ML GRAD NSAF 30ML --

## (undated) DEVICE — SOLUTION LACTATED RINGERS INJECTION USP

## (undated) DEVICE — DERMABOND SKIN ADH 0.7ML -- DERMABOND ADVANCED 12/BX

## (undated) DEVICE — AIRLIFE™ NASAL OXYGEN CANNULA CURVED, FLARED TIP WITH 14 FOOT (4.3 M) CRUSH-RESISTANT TUBING, OVER-THE-EAR STYLE: Brand: AIRLIFE™

## (undated) DEVICE — PMI DISPOSABLE PUNCTURE CLOSURE DEVICE / SUTURE GRASPER: Brand: PMI

## (undated) DEVICE — DEVICE LCK BILI RAP EXCHG OLPS --

## (undated) DEVICE — CATHETER IV 14GA L5.25IN PERIPH ORNG FEP POLYMER 3 BVL

## (undated) DEVICE — SUTURE VCRL SZ 0 L18IN ABSRB UD POLYGLACTIN 910 BRAID TIE J912G

## (undated) DEVICE — TAPE,CLOTH/SILK,CURAD,3"X10YD,LF,40/CS: Brand: CURAD

## (undated) DEVICE — FLUFF AND POLYMER UNDERPAD,EXTRA HEAVY: Brand: WINGS

## (undated) DEVICE — BITE BLOCK ENDOSCP UNIV AD 6 TO 9.4 MM

## (undated) DEVICE — PREP SKN CHLRAPRP APL 26ML STR --

## (undated) DEVICE — LIMB HOLDER, WRIST/ANKLE: Brand: DEROYAL

## (undated) DEVICE — SUTURE VCRL SZ 2 L27IN ABSRB VLT L65MM TP-1 1/2 CIR J649G